# Patient Record
Sex: MALE | Race: WHITE | NOT HISPANIC OR LATINO | Employment: UNEMPLOYED | URBAN - METROPOLITAN AREA
[De-identification: names, ages, dates, MRNs, and addresses within clinical notes are randomized per-mention and may not be internally consistent; named-entity substitution may affect disease eponyms.]

---

## 2020-06-15 PROBLEM — I10 ESSENTIAL (PRIMARY) HYPERTENSION: Status: ACTIVE | Noted: 2017-09-27

## 2020-06-15 PROBLEM — M75.101 ROTATOR CUFF SYNDROME OF RIGHT SHOULDER: Status: ACTIVE | Noted: 2018-06-25

## 2020-06-15 PROBLEM — E11.9 TYPE 2 DIABETES MELLITUS WITHOUT COMPLICATIONS: Status: ACTIVE | Noted: 2017-09-27

## 2020-06-15 PROBLEM — E78.5 HYPERLIPIDEMIA: Status: ACTIVE | Noted: 2017-09-27

## 2020-06-15 PROBLEM — R25.2 LEG CRAMPS: Status: ACTIVE | Noted: 2017-10-05

## 2020-06-15 PROBLEM — E11.65 TYPE 2 DIABETES MELLITUS WITH HYPERGLYCEMIA: Status: ACTIVE | Noted: 2018-07-23

## 2020-06-15 PROBLEM — G47.00 INSOMNIA: Status: ACTIVE | Noted: 2017-09-27

## 2020-06-15 PROBLEM — K59.9 BOWEL DYSFUNCTION: Status: ACTIVE | Noted: 2018-10-22

## 2020-06-15 PROBLEM — R55 SYNCOPE: Status: ACTIVE | Noted: 2017-09-27

## 2020-08-03 ENCOUNTER — HOSPITAL ENCOUNTER (INPATIENT)
Facility: HOSPITAL | Age: 50
LOS: 5 days | Discharge: HOME | DRG: 897 | End: 2020-08-08
Attending: EMERGENCY MEDICINE | Admitting: INTERNAL MEDICINE
Payer: COMMERCIAL

## 2020-08-03 DIAGNOSIS — I10 ESSENTIAL (PRIMARY) HYPERTENSION: ICD-10-CM

## 2020-08-03 DIAGNOSIS — R07.9 CHEST PAIN: ICD-10-CM

## 2020-08-03 DIAGNOSIS — E83.42 HYPOMAGNESEMIA: ICD-10-CM

## 2020-08-03 DIAGNOSIS — R74.01 TRANSAMINITIS: ICD-10-CM

## 2020-08-03 DIAGNOSIS — R55 SYNCOPE: ICD-10-CM

## 2020-08-03 DIAGNOSIS — E87.6 HYPOKALEMIA: ICD-10-CM

## 2020-08-03 DIAGNOSIS — E83.39 HYPOPHOSPHATEMIA: ICD-10-CM

## 2020-08-03 DIAGNOSIS — E11.65 TYPE 2 DIABETES MELLITUS WITH HYPERGLYCEMIA, WITHOUT LONG-TERM CURRENT USE OF INSULIN: ICD-10-CM

## 2020-08-03 DIAGNOSIS — F10.939 ALCOHOL WITHDRAWAL SYNDROME WITH COMPLICATION: Primary | ICD-10-CM

## 2020-08-03 DIAGNOSIS — E87.29 HIGH ANION GAP METABOLIC ACIDOSIS: ICD-10-CM

## 2020-08-03 DIAGNOSIS — E78.5 HYPERLIPIDEMIA, UNSPECIFIED HYPERLIPIDEMIA TYPE: ICD-10-CM

## 2020-08-03 DIAGNOSIS — F10.931 ALCOHOL WITHDRAWAL DELIRIUM, ACUTE, HYPERACTIVE: ICD-10-CM

## 2020-08-03 PROBLEM — D53.9 MACROCYTIC ANEMIA: Status: ACTIVE | Noted: 2020-08-03

## 2020-08-03 PROBLEM — R63.4 UNINTENTIONAL WEIGHT LOSS: Status: ACTIVE | Noted: 2020-08-03

## 2020-08-03 LAB
ALBUMIN SERPL BCP-MCNC: 3 G/DL (ref 3.5–5.2)
ALLENS TEST: ABNORMAL
ALP SERPL-CCNC: 164 U/L (ref 55–135)
ALT SERPL W/O P-5'-P-CCNC: 63 U/L (ref 10–44)
AMPHET+METHAMPHET UR QL: NEGATIVE
ANION GAP SERPL CALC-SCNC: 18 MMOL/L (ref 8–16)
AST SERPL-CCNC: 236 U/L (ref 10–40)
B-OH-BUTYR BLD STRIP-SCNC: 0.5 MMOL/L (ref 0–0.5)
BARBITURATES UR QL SCN>200 NG/ML: NEGATIVE
BASOPHILS # BLD AUTO: 0.04 K/UL (ref 0–0.2)
BASOPHILS NFR BLD: 0.8 % (ref 0–1.9)
BENZODIAZ UR QL SCN>200 NG/ML: NEGATIVE
BILIRUB SERPL-MCNC: 0.7 MG/DL (ref 0.1–1)
BUN SERPL-MCNC: 6 MG/DL (ref 6–20)
BZE UR QL SCN: NEGATIVE
CALCIUM SERPL-MCNC: 7.8 MG/DL (ref 8.7–10.5)
CANNABINOIDS UR QL SCN: NEGATIVE
CHLORIDE SERPL-SCNC: 104 MMOL/L (ref 95–110)
CK SERPL-CCNC: 80 U/L (ref 20–200)
CO2 SERPL-SCNC: 17 MMOL/L (ref 23–29)
CREAT SERPL-MCNC: 1.3 MG/DL (ref 0.5–1.4)
CREAT UR-MCNC: 25.3 MG/DL (ref 23–375)
DIFFERENTIAL METHOD: ABNORMAL
EOSINOPHIL # BLD AUTO: 0 K/UL (ref 0–0.5)
EOSINOPHIL NFR BLD: 0.6 % (ref 0–8)
ERYTHROCYTE [DISTWIDTH] IN BLOOD BY AUTOMATED COUNT: 12.7 % (ref 11.5–14.5)
EST. GFR  (AFRICAN AMERICAN): >60 ML/MIN/1.73 M^2
EST. GFR  (NON AFRICAN AMERICAN): >60 ML/MIN/1.73 M^2
ESTIMATED AVG GLUCOSE: 105 MG/DL (ref 68–131)
ETHANOL SERPL-MCNC: 137 MG/DL
FERRITIN SERPL-MCNC: 584 NG/ML (ref 20–300)
GLUCOSE SERPL-MCNC: 271 MG/DL (ref 70–110)
HBA1C MFR BLD HPLC: 5.3 % (ref 4–5.6)
HCO3 UR-SCNC: 16.6 MMOL/L (ref 24–28)
HCT VFR BLD AUTO: 33.1 % (ref 40–54)
HGB BLD-MCNC: 11.3 G/DL (ref 14–18)
IMM GRANULOCYTES # BLD AUTO: 0.01 K/UL (ref 0–0.04)
IMM GRANULOCYTES NFR BLD AUTO: 0.2 % (ref 0–0.5)
INR PPP: 1.2 (ref 0.8–1.2)
LYMPHOCYTES # BLD AUTO: 0.6 K/UL (ref 1–4.8)
LYMPHOCYTES NFR BLD: 11.6 % (ref 18–48)
MAGNESIUM SERPL-MCNC: 1.2 MG/DL (ref 1.6–2.6)
MCH RBC QN AUTO: 36 PG (ref 27–31)
MCHC RBC AUTO-ENTMCNC: 34.1 G/DL (ref 32–36)
MCV RBC AUTO: 105 FL (ref 82–98)
METHADONE UR QL SCN>300 NG/ML: NEGATIVE
MONOCYTES # BLD AUTO: 0.4 K/UL (ref 0.3–1)
MONOCYTES NFR BLD: 8.2 % (ref 4–15)
NEUTROPHILS # BLD AUTO: 3.9 K/UL (ref 1.8–7.7)
NEUTROPHILS NFR BLD: 78.6 % (ref 38–73)
NRBC BLD-RTO: 0 /100 WBC
OPIATES UR QL SCN: NEGATIVE
PCO2 BLDA: 29.1 MMHG (ref 35–45)
PCP UR QL SCN>25 NG/ML: NEGATIVE
PH SMN: 7.37 [PH] (ref 7.35–7.45)
PLATELET # BLD AUTO: 111 K/UL (ref 150–350)
PMV BLD AUTO: 10.9 FL (ref 9.2–12.9)
PO2 BLDA: 67 MMHG (ref 40–60)
POC BE: -9 MMOL/L
POC SATURATED O2: 93 % (ref 95–100)
POC TCO2: 18 MMOL/L (ref 24–29)
POCT GLUCOSE: 249 MG/DL (ref 70–110)
POTASSIUM SERPL-SCNC: 3.4 MMOL/L (ref 3.5–5.1)
PROT SERPL-MCNC: 6.6 G/DL (ref 6–8.4)
PROTHROMBIN TIME: 12.1 SEC (ref 9–12.5)
RBC # BLD AUTO: 3.14 M/UL (ref 4.6–6.2)
SAMPLE: ABNORMAL
SARS-COV-2 RDRP RESP QL NAA+PROBE: NEGATIVE
SITE: ABNORMAL
SODIUM SERPL-SCNC: 139 MMOL/L (ref 136–145)
TOXICOLOGY INFORMATION: NORMAL
TROPONIN I SERPL DL<=0.01 NG/ML-MCNC: 0.01 NG/ML (ref 0–0.03)
WBC # BLD AUTO: 5 K/UL (ref 3.9–12.7)

## 2020-08-03 PROCEDURE — 80320 DRUG SCREEN QUANTALCOHOLS: CPT

## 2020-08-03 PROCEDURE — 82550 ASSAY OF CK (CPK): CPT

## 2020-08-03 PROCEDURE — 96361 HYDRATE IV INFUSION ADD-ON: CPT

## 2020-08-03 PROCEDURE — 25000003 PHARM REV CODE 250: Performed by: EMERGENCY MEDICINE

## 2020-08-03 PROCEDURE — 82728 ASSAY OF FERRITIN: CPT

## 2020-08-03 PROCEDURE — 25000003 PHARM REV CODE 250: Performed by: STUDENT IN AN ORGANIZED HEALTH CARE EDUCATION/TRAINING PROGRAM

## 2020-08-03 PROCEDURE — U0002 COVID-19 LAB TEST NON-CDC: HCPCS

## 2020-08-03 PROCEDURE — 96376 TX/PRO/DX INJ SAME DRUG ADON: CPT

## 2020-08-03 PROCEDURE — 63600175 PHARM REV CODE 636 W HCPCS: Performed by: STUDENT IN AN ORGANIZED HEALTH CARE EDUCATION/TRAINING PROGRAM

## 2020-08-03 PROCEDURE — 93005 ELECTROCARDIOGRAM TRACING: CPT

## 2020-08-03 PROCEDURE — 80307 DRUG TEST PRSMV CHEM ANLYZR: CPT

## 2020-08-03 PROCEDURE — 96375 TX/PRO/DX INJ NEW DRUG ADDON: CPT

## 2020-08-03 PROCEDURE — 80053 COMPREHEN METABOLIC PANEL: CPT

## 2020-08-03 PROCEDURE — 85610 PROTHROMBIN TIME: CPT

## 2020-08-03 PROCEDURE — 63600175 PHARM REV CODE 636 W HCPCS: Performed by: EMERGENCY MEDICINE

## 2020-08-03 PROCEDURE — 99900035 HC TECH TIME PER 15 MIN (STAT)

## 2020-08-03 PROCEDURE — 82962 GLUCOSE BLOOD TEST: CPT

## 2020-08-03 PROCEDURE — 83735 ASSAY OF MAGNESIUM: CPT

## 2020-08-03 PROCEDURE — 82010 KETONE BODYS QUAN: CPT

## 2020-08-03 PROCEDURE — 12000002 HC ACUTE/MED SURGE SEMI-PRIVATE ROOM

## 2020-08-03 PROCEDURE — 82803 BLOOD GASES ANY COMBINATION: CPT

## 2020-08-03 PROCEDURE — 82607 VITAMIN B-12: CPT

## 2020-08-03 PROCEDURE — 99291 CRITICAL CARE FIRST HOUR: CPT | Mod: 25

## 2020-08-03 PROCEDURE — 84484 ASSAY OF TROPONIN QUANT: CPT

## 2020-08-03 PROCEDURE — 82746 ASSAY OF FOLIC ACID SERUM: CPT

## 2020-08-03 PROCEDURE — 85025 COMPLETE CBC W/AUTO DIFF WBC: CPT

## 2020-08-03 PROCEDURE — 83036 HEMOGLOBIN GLYCOSYLATED A1C: CPT

## 2020-08-03 PROCEDURE — 96365 THER/PROPH/DIAG IV INF INIT: CPT

## 2020-08-03 PROCEDURE — 83540 ASSAY OF IRON: CPT

## 2020-08-03 RX ORDER — LORAZEPAM 1 MG/1
2 TABLET ORAL EVERY 4 HOURS PRN
Status: DISCONTINUED | OUTPATIENT
Start: 2020-08-03 | End: 2020-08-03

## 2020-08-03 RX ORDER — SODIUM CHLORIDE 9 MG/ML
1000 INJECTION, SOLUTION INTRAVENOUS
Status: COMPLETED | OUTPATIENT
Start: 2020-08-03 | End: 2020-08-03

## 2020-08-03 RX ORDER — POTASSIUM CHLORIDE 7.45 MG/ML
10 INJECTION INTRAVENOUS
Status: DISPENSED | OUTPATIENT
Start: 2020-08-03 | End: 2020-08-04

## 2020-08-03 RX ORDER — CALCIUM CARBONATE 200(500)MG
1000 TABLET,CHEWABLE ORAL
Status: COMPLETED | OUTPATIENT
Start: 2020-08-03 | End: 2020-08-03

## 2020-08-03 RX ORDER — SODIUM CHLORIDE 0.9 % (FLUSH) 0.9 %
10 SYRINGE (ML) INJECTION
Status: DISCONTINUED | OUTPATIENT
Start: 2020-08-03 | End: 2020-08-08 | Stop reason: HOSPADM

## 2020-08-03 RX ORDER — LORAZEPAM 2 MG/ML
2 CONCENTRATE ORAL ONCE
Status: DISCONTINUED | OUTPATIENT
Start: 2020-08-03 | End: 2020-08-03

## 2020-08-03 RX ORDER — FOLIC ACID 1 MG/1
1 TABLET ORAL DAILY
Status: DISCONTINUED | OUTPATIENT
Start: 2020-08-04 | End: 2020-08-08 | Stop reason: HOSPADM

## 2020-08-03 RX ORDER — INSULIN ASPART 100 [IU]/ML
1-10 INJECTION, SOLUTION INTRAVENOUS; SUBCUTANEOUS
Status: DISCONTINUED | OUTPATIENT
Start: 2020-08-03 | End: 2020-08-05

## 2020-08-03 RX ORDER — ASPIRIN 325 MG
325 TABLET ORAL
Status: DISCONTINUED | OUTPATIENT
Start: 2020-08-03 | End: 2020-08-03

## 2020-08-03 RX ORDER — LANOLIN ALCOHOL/MO/W.PET/CERES
400 CREAM (GRAM) TOPICAL ONCE
Status: COMPLETED | OUTPATIENT
Start: 2020-08-03 | End: 2020-08-03

## 2020-08-03 RX ORDER — IBUPROFEN 200 MG
24 TABLET ORAL
Status: DISCONTINUED | OUTPATIENT
Start: 2020-08-03 | End: 2020-08-05

## 2020-08-03 RX ORDER — MAGNESIUM SULFATE HEPTAHYDRATE 40 MG/ML
2 INJECTION, SOLUTION INTRAVENOUS ONCE
Status: COMPLETED | OUTPATIENT
Start: 2020-08-03 | End: 2020-08-03

## 2020-08-03 RX ORDER — GLUCAGON 1 MG
1 KIT INJECTION
Status: DISCONTINUED | OUTPATIENT
Start: 2020-08-03 | End: 2020-08-05

## 2020-08-03 RX ORDER — DIAZEPAM 5 MG/1
10 TABLET ORAL EVERY 6 HOURS
Status: DISCONTINUED | OUTPATIENT
Start: 2020-08-03 | End: 2020-08-06

## 2020-08-03 RX ORDER — THIAMINE HCL 100 MG
100 TABLET ORAL DAILY
Status: DISCONTINUED | OUTPATIENT
Start: 2020-08-04 | End: 2020-08-03

## 2020-08-03 RX ORDER — IBUPROFEN 200 MG
16 TABLET ORAL
Status: DISCONTINUED | OUTPATIENT
Start: 2020-08-03 | End: 2020-08-05

## 2020-08-03 RX ORDER — THIAMINE HCL 100 MG
100 TABLET ORAL DAILY
Status: DISCONTINUED | OUTPATIENT
Start: 2020-08-04 | End: 2020-08-08 | Stop reason: HOSPADM

## 2020-08-03 RX ADMIN — LORAZEPAM 1 MG: 2 INJECTION INTRAMUSCULAR; INTRAVENOUS at 07:08

## 2020-08-03 RX ADMIN — POTASSIUM CHLORIDE 10 MEQ: 7.46 INJECTION, SOLUTION INTRAVENOUS at 09:08

## 2020-08-03 RX ADMIN — SODIUM CHLORIDE 1000 ML: 0.9 INJECTION, SOLUTION INTRAVENOUS at 06:08

## 2020-08-03 RX ADMIN — SODIUM CHLORIDE 1000 ML: 0.9 INJECTION, SOLUTION INTRAVENOUS at 07:08

## 2020-08-03 RX ADMIN — Medication 400 MG: at 07:08

## 2020-08-03 RX ADMIN — MAGNESIUM SULFATE IN WATER 2 G: 40 INJECTION, SOLUTION INTRAVENOUS at 09:08

## 2020-08-03 RX ADMIN — BACITRACIN, NEOMYCIN, POLYMYXIN B 1 EACH: 400; 3.5; 5 OINTMENT TOPICAL at 06:08

## 2020-08-03 RX ADMIN — LORAZEPAM 5 MG: 2 INJECTION INTRAMUSCULAR; INTRAVENOUS at 11:08

## 2020-08-03 RX ADMIN — LORAZEPAM 2 MG: 2 INJECTION INTRAMUSCULAR; INTRAVENOUS at 09:08

## 2020-08-03 RX ADMIN — LORAZEPAM 1 MG: 2 INJECTION INTRAMUSCULAR; INTRAVENOUS at 06:08

## 2020-08-03 RX ADMIN — DIAZEPAM 10 MG: 5 TABLET ORAL at 09:08

## 2020-08-03 RX ADMIN — CALCIUM CARBONATE (ANTACID) CHEW TAB 500 MG 1000 MG: 500 CHEW TAB at 07:08

## 2020-08-03 NOTE — ED PROVIDER NOTES
Encounter Date: 8/3/2020    SCRIBE #1 NOTE: I, Kwesi Singh , am scribing for, and in the presence of, Dr. Guy Lefort .       History     Chief Complaint   Patient presents with    Fall     Patient states that he tripped and fell just PTA - EMS reports patient found on ground, awake with BP 60/P. On arrival, awake, alert, oriented. Abrasions noted to right brow, right elbow, and left knee. Patient insists that he tripped. EMS reports that they believe he passed out.      Patient is a 50 y.o male who presents to the ED after a fall. He face planted outside of a gas station. Pt had not eaten anything all day. EMS report that he passed out. Pt was drinking heavily over the pass couple of days. Pt has bruised knuckles and knee from fall. He reports not being in any pain except for back of neck. ROM normal.         Review of patient's allergies indicates:  No Known Allergies  Past Medical History:   Diagnosis Date    Diabetes mellitus      Past Surgical History:   Procedure Laterality Date    BACK SURGERY      KNEE SURGERY       History reviewed. No pertinent family history.  Social History     Tobacco Use    Smoking status: Current Some Day Smoker   Substance Use Topics    Alcohol use: Yes     Comment: social    Drug use: No     Review of Systems   Unable to perform ROS: Acuity of condition       Physical Exam     Initial Vitals [08/03/20 1716]   BP Pulse Resp Temp SpO2   98/64 82 17 -- 98 %      MAP       --         Physical Exam    Nursing note and vitals reviewed.  Constitutional: He appears well-developed and well-nourished. He is diaphoretic. No distress.   HENT:   Head: Normocephalic.   Abrasion R eyebrow  DMM   Eyes: Conjunctivae and EOM are normal. Pupils are equal, round, and reactive to light.   Neck: Neck supple.   No MLT   Cardiovascular: Normal rate, regular rhythm and normal heart sounds. Exam reveals no gallop and no friction rub.    No murmur heard.  Pulmonary/Chest: Breath sounds normal. He has no  wheezes. He has no rhonchi. He has no rales.   Abdominal: Soft. He exhibits no mass. There is no abdominal tenderness. There is no rebound and no guarding.   Musculoskeletal: Normal range of motion. No tenderness or edema.   Neurological: He is alert and oriented to person, place, and time. He has normal strength.   tremulous   Skin: Skin is warm. No rash and no abscess noted.   bruised knuckles and knee from fall  Scattered abrasions, various aged appearance   Psychiatric:   anxious         ED Course   Critical Care    Date/Time: 8/3/2020 7:19 PM  Performed by: Guy J. Lefort, MD  Authorized by: Guy J. Lefort, MD   Total critical care time (exclusive of procedural time) : 35 minutes  Critical care time was exclusive of separately billable procedures and treating other patients.  Critical care was necessary to treat or prevent imminent or life-threatening deterioration of the following conditions: metabolic crisis and dehydration.  Critical care was time spent personally by me on the following activities: ordering and review of radiographic studies, ordering and review of laboratory studies, ordering and performing treatments and interventions, obtaining history from patient or surrogate, blood draw for specimens, development of treatment plan with patient or surrogate, evaluation of patient's response to treatment, examination of patient, review of old charts and re-evaluation of patient's condition.        Labs Reviewed   COMPREHENSIVE METABOLIC PANEL   CBC W/ AUTO DIFFERENTIAL   PROTIME-INR   TROPONIN I   MAGNESIUM   CK   POCT GLUCOSE MONITORING CONTINUOUS          Imaging Results    None          Medical Decision Making:   Differential Diagnosis:   Differential Diagnosis includes, but is not limited to:  Arrhythmia, aortic dissection, MI/unstable angina, PE, cardiogenic shock, CHF, CVA/TIA, intracranial lesion/mass, seizure, perforated viscous, ruptured AAA, orthostatic hypotension, vasovagal episode, anemia,  dehydration, medication reaction, intentional overdose    Independently Interpreted Test(s):   I have ordered and independently interpreted X-rays - see prior notes.  I have ordered and independently interpreted EKG Reading(s) - see prior notes  Clinical Tests:   Lab Tests: Ordered and Reviewed  Radiological Study: Ordered and Reviewed  Medical Tests: Ordered and Reviewed  ED Management:  C-collar cleared      CIWA 13  Requiring repeat ativan dosing for tremors  Remains symptomatic  AG noted    Other:   I have discussed this case with another health care provider.                   ED Course as of Aug 04 1622   Mon Aug 03, 2020   1929 Spoke with [facility] who will admit the patient.     [AF]      ED Course User Index  [AF] Jazlyn Paniagua            Clinical Impression:     1. Chest pain    2. Syncope                     I, Dr. Guy Lefort, personally performed the services described in this documentation. All medical record entries made by the scribe were at my direction and in my presence. I have reviewed the chart and agree that the record reflects my personal performance and is accurate and complete. Guy Lefort, MD.  7:14 PM 08/03/2020  scribe attestation               Guy J. Lefort, MD  08/04/20 3499

## 2020-08-04 ENCOUNTER — CLINICAL SUPPORT (OUTPATIENT)
Dept: SMOKING CESSATION | Facility: CLINIC | Age: 50
End: 2020-08-04

## 2020-08-04 DIAGNOSIS — F17.210 CIGARETTE SMOKER: Primary | ICD-10-CM

## 2020-08-04 LAB
ALBUMIN SERPL BCP-MCNC: 3 G/DL (ref 3.5–5.2)
ALP SERPL-CCNC: 152 U/L (ref 55–135)
ALT SERPL W/O P-5'-P-CCNC: 54 U/L (ref 10–44)
ANION GAP SERPL CALC-SCNC: 11 MMOL/L (ref 8–16)
AST SERPL-CCNC: 171 U/L (ref 10–40)
BASOPHILS # BLD AUTO: 0.02 K/UL (ref 0–0.2)
BASOPHILS NFR BLD: 0.5 % (ref 0–1.9)
BILIRUB SERPL-MCNC: 0.9 MG/DL (ref 0.1–1)
BUN SERPL-MCNC: 7 MG/DL (ref 6–20)
CALCIUM SERPL-MCNC: 8.1 MG/DL (ref 8.7–10.5)
CHLORIDE SERPL-SCNC: 107 MMOL/L (ref 95–110)
CHOLEST SERPL-MCNC: 137 MG/DL (ref 120–199)
CHOLEST/HDLC SERPL: 2.1 {RATIO} (ref 2–5)
CO2 SERPL-SCNC: 23 MMOL/L (ref 23–29)
CREAT SERPL-MCNC: 1.3 MG/DL (ref 0.5–1.4)
DIFFERENTIAL METHOD: ABNORMAL
EOSINOPHIL # BLD AUTO: 0 K/UL (ref 0–0.5)
EOSINOPHIL NFR BLD: 0.2 % (ref 0–8)
ERYTHROCYTE [DISTWIDTH] IN BLOOD BY AUTOMATED COUNT: 12.6 % (ref 11.5–14.5)
EST. GFR  (AFRICAN AMERICAN): >60 ML/MIN/1.73 M^2
EST. GFR  (NON AFRICAN AMERICAN): >60 ML/MIN/1.73 M^2
FOLATE SERPL-MCNC: 7.9 NG/ML (ref 4–24)
GLUCOSE SERPL-MCNC: 170 MG/DL (ref 70–110)
HCT VFR BLD AUTO: 31.9 % (ref 40–54)
HDLC SERPL-MCNC: 64 MG/DL (ref 40–75)
HDLC SERPL: 46.7 % (ref 20–50)
HGB BLD-MCNC: 10.8 G/DL (ref 14–18)
IMM GRANULOCYTES # BLD AUTO: 0.01 K/UL (ref 0–0.04)
IMM GRANULOCYTES NFR BLD AUTO: 0.2 % (ref 0–0.5)
IRON SERPL-MCNC: 63 UG/DL (ref 45–160)
LDLC SERPL CALC-MCNC: 58.8 MG/DL (ref 63–159)
LYMPHOCYTES # BLD AUTO: 0.4 K/UL (ref 1–4.8)
LYMPHOCYTES NFR BLD: 10.2 % (ref 18–48)
MCH RBC QN AUTO: 35.8 PG (ref 27–31)
MCHC RBC AUTO-ENTMCNC: 33.9 G/DL (ref 32–36)
MCV RBC AUTO: 106 FL (ref 82–98)
MONOCYTES # BLD AUTO: 0.5 K/UL (ref 0.3–1)
MONOCYTES NFR BLD: 11.6 % (ref 4–15)
NEUTROPHILS # BLD AUTO: 3.3 K/UL (ref 1.8–7.7)
NEUTROPHILS NFR BLD: 77.3 % (ref 38–73)
NONHDLC SERPL-MCNC: 73 MG/DL
NRBC BLD-RTO: 0 /100 WBC
PLATELET # BLD AUTO: 91 K/UL (ref 150–350)
PMV BLD AUTO: 11 FL (ref 9.2–12.9)
POCT GLUCOSE: 146 MG/DL (ref 70–110)
POCT GLUCOSE: 160 MG/DL (ref 70–110)
POCT GLUCOSE: 183 MG/DL (ref 70–110)
POCT GLUCOSE: 185 MG/DL (ref 70–110)
POCT GLUCOSE: 191 MG/DL (ref 70–110)
POTASSIUM SERPL-SCNC: 3.4 MMOL/L (ref 3.5–5.1)
PROT SERPL-MCNC: 6.9 G/DL (ref 6–8.4)
RBC # BLD AUTO: 3.02 M/UL (ref 4.6–6.2)
SATURATED IRON: 26 % (ref 20–50)
SODIUM SERPL-SCNC: 141 MMOL/L (ref 136–145)
TOTAL IRON BINDING CAPACITY: 244 UG/DL (ref 250–450)
TRANSFERRIN SERPL-MCNC: 165 MG/DL (ref 200–375)
TRANSFERRIN SERPL-MCNC: 165 MG/DL (ref 200–375)
TRIGL SERPL-MCNC: 71 MG/DL (ref 30–150)
VIT B12 SERPL-MCNC: 1006 PG/ML (ref 210–950)
WBC # BLD AUTO: 4.31 K/UL (ref 3.9–12.7)

## 2020-08-04 PROCEDURE — 63600175 PHARM REV CODE 636 W HCPCS: Performed by: STUDENT IN AN ORGANIZED HEALTH CARE EDUCATION/TRAINING PROGRAM

## 2020-08-04 PROCEDURE — 25000003 PHARM REV CODE 250: Performed by: STUDENT IN AN ORGANIZED HEALTH CARE EDUCATION/TRAINING PROGRAM

## 2020-08-04 PROCEDURE — 80061 LIPID PANEL: CPT

## 2020-08-04 PROCEDURE — 99406 PT REFUSED TOBACCO CESSATION: ICD-10-PCS | Mod: S$GLB,,,

## 2020-08-04 PROCEDURE — 27000221 HC OXYGEN, UP TO 24 HOURS

## 2020-08-04 PROCEDURE — 63600175 PHARM REV CODE 636 W HCPCS: Performed by: INTERNAL MEDICINE

## 2020-08-04 PROCEDURE — 94761 N-INVAS EAR/PLS OXIMETRY MLT: CPT

## 2020-08-04 PROCEDURE — 99900035 HC TECH TIME PER 15 MIN (STAT)

## 2020-08-04 PROCEDURE — 80053 COMPREHEN METABOLIC PANEL: CPT

## 2020-08-04 PROCEDURE — 85025 COMPLETE CBC W/AUTO DIFF WBC: CPT

## 2020-08-04 PROCEDURE — 11000001 HC ACUTE MED/SURG PRIVATE ROOM

## 2020-08-04 PROCEDURE — 99406 BEHAV CHNG SMOKING 3-10 MIN: CPT | Mod: S$GLB,,,

## 2020-08-04 RX ORDER — LORAZEPAM 2 MG/ML
3 INJECTION INTRAMUSCULAR EVERY 4 HOURS
Status: CANCELLED | OUTPATIENT
Start: 2020-08-04

## 2020-08-04 RX ORDER — ENOXAPARIN SODIUM 100 MG/ML
40 INJECTION SUBCUTANEOUS EVERY 24 HOURS
Status: DISCONTINUED | OUTPATIENT
Start: 2020-08-04 | End: 2020-08-08 | Stop reason: HOSPADM

## 2020-08-04 RX ORDER — DEXMEDETOMIDINE HYDROCHLORIDE 4 UG/ML
0.5 INJECTION, SOLUTION INTRAVENOUS CONTINUOUS
Status: DISCONTINUED | OUTPATIENT
Start: 2020-08-04 | End: 2020-08-04

## 2020-08-04 RX ORDER — GABAPENTIN 300 MG/1
300 CAPSULE ORAL 3 TIMES DAILY
Status: DISCONTINUED | OUTPATIENT
Start: 2020-08-04 | End: 2020-08-04

## 2020-08-04 RX ORDER — GABAPENTIN 100 MG/1
200 CAPSULE ORAL 2 TIMES DAILY
Status: DISCONTINUED | OUTPATIENT
Start: 2020-08-04 | End: 2020-08-05

## 2020-08-04 RX ORDER — POTASSIUM CHLORIDE 20 MEQ/1
40 TABLET, EXTENDED RELEASE ORAL ONCE
Status: COMPLETED | OUTPATIENT
Start: 2020-08-04 | End: 2020-08-04

## 2020-08-04 RX ORDER — ENOXAPARIN SODIUM 100 MG/ML
30 INJECTION SUBCUTANEOUS EVERY 24 HOURS
Status: DISCONTINUED | OUTPATIENT
Start: 2020-08-04 | End: 2020-08-04

## 2020-08-04 RX ORDER — DEXMEDETOMIDINE HYDROCHLORIDE 4 UG/ML
INJECTION, SOLUTION INTRAVENOUS
Status: DISPENSED
Start: 2020-08-04 | End: 2020-08-04

## 2020-08-04 RX ADMIN — POTASSIUM CHLORIDE 40 MEQ: 1500 TABLET, EXTENDED RELEASE ORAL at 08:08

## 2020-08-04 RX ADMIN — GABAPENTIN 300 MG: 300 CAPSULE ORAL at 03:08

## 2020-08-04 RX ADMIN — ENOXAPARIN SODIUM 40 MG: 100 INJECTION SUBCUTANEOUS at 06:08

## 2020-08-04 RX ADMIN — DIAZEPAM 10 MG: 5 TABLET ORAL at 06:08

## 2020-08-04 RX ADMIN — LORAZEPAM 3 MG: 2 INJECTION INTRAMUSCULAR; INTRAVENOUS at 10:08

## 2020-08-04 RX ADMIN — DIAZEPAM 10 MG: 5 TABLET ORAL at 05:08

## 2020-08-04 RX ADMIN — GABAPENTIN 200 MG: 100 CAPSULE ORAL at 09:08

## 2020-08-04 RX ADMIN — GABAPENTIN 200 MG: 100 CAPSULE ORAL at 08:08

## 2020-08-04 RX ADMIN — LORAZEPAM 3 MG: 2 INJECTION INTRAMUSCULAR; INTRAVENOUS at 05:08

## 2020-08-04 RX ADMIN — LORAZEPAM 3 MG: 2 INJECTION INTRAMUSCULAR; INTRAVENOUS at 01:08

## 2020-08-04 RX ADMIN — INSULIN ASPART 2 UNITS: 100 INJECTION, SOLUTION INTRAVENOUS; SUBCUTANEOUS at 11:08

## 2020-08-04 RX ADMIN — INSULIN ASPART 2 UNITS: 100 INJECTION, SOLUTION INTRAVENOUS; SUBCUTANEOUS at 07:08

## 2020-08-04 RX ADMIN — DIAZEPAM 10 MG: 5 TABLET ORAL at 11:08

## 2020-08-04 RX ADMIN — DEXMEDETOMIDINE HYDROCHLORIDE 0.5 MCG/KG/HR: 4 INJECTION, SOLUTION INTRAVENOUS at 03:08

## 2020-08-04 RX ADMIN — LORAZEPAM 3 MG: 2 INJECTION INTRAMUSCULAR; INTRAVENOUS at 09:08

## 2020-08-04 RX ADMIN — DIAZEPAM 10 MG: 5 TABLET ORAL at 12:08

## 2020-08-04 RX ADMIN — THERA TABS 1 TABLET: TAB at 08:08

## 2020-08-04 RX ADMIN — FOLIC ACID 1 MG: 1 TABLET ORAL at 08:08

## 2020-08-04 RX ADMIN — Medication 100 MG: at 08:08

## 2020-08-04 NOTE — PROGRESS NOTES
"[facility] Resident HO-4 Progress Note    Subjective:      Abe Cook is a 50 y.o.  male who is being followed by the [facility] service at [facility] for EtOH withdrawal. Overnight, moved to the ICU for increasing doses of ativan and requiring a precedex drip. This morning he is sleeping comfortably, no longer tachycardic. Still jittery when aroused. Denies specific complaints of HA, CP, SOB.     Objective:   Last 24 Hour Vital Signs:  BP  Min: 98/64  Max: 166/95  Temp  Av.6 °F (37 °C)  Min: 97.4 °F (36.3 °C)  Max: 99.7 °F (37.6 °C)  Pulse  Av.5  Min: 78  Max: 156  Resp  Av.5  Min: 15  Max: 36  SpO2  Av.7 %  Min: 93 %  Max: 98 %  Height  Av' 8.5" (174 cm)  Min: 5' 8" (172.7 cm)  Max: 5' 9" (175.3 cm)  Weight  Av.6 kg (186 lb 7.2 oz)  Min: 81.6 kg (180 lb)  Max: 87.5 kg (192 lb 14.4 oz)  I/O last 3 completed shifts:  In: 4448.3 [P.O.:50; I.V.:4298.3; IV Piggyback:100]  Out: 900 [Urine:900]    Physical Examination:  GEN: sleeping, easily awakens  HEENT: NC, superficial abrasion to right eye brow, no conjunctival injection, no rhinorhea  RESP: CTAB, no wheezes, on NC  CV: RRR, no murmurs, cap refill 3 seconds  ABD: BS present, soft, NT, ND   MSK: no deformities, no TTP  DERM: no rashes, multiple scabs over knuckles and palms  NEURO: A&O, no facial asymmetry, motor function grossly intact    Laboratory:  Laboratory Data Reviewed: yes  Pertinent Findings:  Recent Labs   Lab 20  1748 20  0326   WBC 5.00 4.31   HGB 11.3* 10.8*   HCT 33.1* 31.9*   * 91*   * 106*   RDW 12.7 12.6    141   K 3.4* 3.4*    107   CO2 17* 23   BUN 6 7   CREATININE 1.3 1.3   * 170*   PROT 6.6 6.9   ALBUMIN 3.0* 3.0*   BILITOT 0.7 0.9   * 171*   ALKPHOS 164* 152*   ALT 63* 54*       Microbiology Data Reviewed: yes  Pertinent Findings:  None    Other Results:  EKG (my interpretation): None new    Radiology Data Reviewed: yes  Pertinent " "Findings:  None new    Current Medications:     Infusions:   dexmedetomidine (PRECEDEX) infusion 0.6 mcg/kg/hr (08/04/20 0620)        Scheduled:   diazePAM  10 mg Oral Q6H    enoxaparin  30 mg Subcutaneous Q24H    folic acid  1 mg Oral Daily    gabapentin  200 mg Oral BID    multivitamin  1 tablet Oral Daily    potassium chloride  40 mEq Oral Once    thiamine  100 mg Oral Daily        PRN:  dextrose 50%, dextrose 50%, glucagon (human recombinant), glucose, glucose, insulin aspart U-100, lorazepam, sodium chloride 0.9%    Antibiotics and Day Number of Therapy:  None    Lines and Day Number of Therapy:  PIV 8/4-    Assessment:     Abe Cook is a 50 y.o. male with alcohol dependence, HTN, HLD, and T2DM who presents with EtOH wiithdrawal   Plan:     #Alcohol withdrawal  #Unintentional weight loss  #Fall  #Transaminitis  -Patient drinks daily for many months, recently increased during COVID quarantine, endorses approximately 20 lbs weight loss over this time  -Last drink was morning of admission  -Patient counseled on the health benefits of alcohol cessation, currently action stage of change, states "I don't want to drink when I leave the hospital"   -tripped and fell day of admission with negative head/neck imaging, superficial scrapes treated in ED  -still tachycardic and tremulous in the ED after 9 mg Ativan so moved to ICU  -Current regimen: Valium 10 mg q6hr, Precedex 0.5, Gabapentin 200 BID, Ativan 3 mg IV q1hr PRN (required once overnight)  -LFTs improving     #Macrocytic anemia  -likely related to malnutrition and EtOH abuse  -Around historyical Hgb baseline at 11.3, MCV higher than in the past at 105  -Iron studies consistent with chronic inflammation, folate and B12 normal  -Thiamine, folate and MVI     #Thrombocytopenia  -likely related to EtOH abuse  -trend daily     #AGMA  #Electrolyte derrangemnt  -Likely related to acute on chronic EtOH abuse and poor nutritional status   -Trend and replete " PRN     #T2DM not on long-term insulin  -A1C 5.3 this admission  -Holding home metformin  -SSI while inpatient     #HLD  -holding home statin     #HTN  -Holding home lisinoprol     Diet: Diabetic  PPX: Lovenox  FULL CODE     Dispo: wean Precedex this AM, hopeful step down later today    Liam Gómez  [facility] Internal Medicine HO-4    [facility] Medicine Hospitalist Pager numbers:   [facility] Hospitalist Medicine Team A (Andi/Khalida): [phone]  [facility] Hospitalist Medicine Team B (Jens/Barbie):  [phone]

## 2020-08-04 NOTE — ED NOTES
"  Report received from Lida FLOWERS and care assumed.   Adult Physical Assessment  LOC: Abe Cook, 50 y.o. male verified via two identifiers.  The patient is awake, alert, oriented and speaking appropriately at this time.  APPEARANCE: Patient restless and not comfortable upon assessment but appears to be in no acute distress at this time. Patient is clean and well groomed, patient's clothing is properly fastened.  SKIN:The skin is warm and dry, color consistent with ethnicity, patient has diminished  skin turgor and moist mucus membranes, multiple abrasions previously documented today.  MUSCULOSKELETAL: Patient moving all extremities well, no obvious swelling or deformities noted.  RESPIRATORY: Airway is open and patent, respirations are spontaneous, patient has a normal effort and rate, no accessory muscle use noted.  CARDIAC: Patient has a normal rate and rhythm, no periphreal edema noted in any extremity, capillary refill < 3 seconds in all extremities  ABDOMEN: Soft, ROUND and non tender to palpation, no abdominal distention noted. Bowel sounds present in all four quadrants.  NEUROLOGIC: Eyes open spontaneously, behavior appropriate to situation, follows commands, facial expression symmetrical, bilateral hand grasp equal and even, purposeful motor response noted, normal sensation in all extremities when touched with a finger.  Patient is very shaky, ambulated to the BR w some difficulty, states, "I hurt all over".  "

## 2020-08-04 NOTE — PLAN OF CARE
Date of Admit: 8/3/2020     Chief Complaint      Fall (Patient states that he tripped and fell just PTA - EMS reports patient found on ground, awake with BP 60/P. On arrival, awake, alert, oriented. Abrasions noted to right brow, right elbow, and left knee. Patient insists that he tripped. EMS reports that they believe he passed out. )   for 1 day     Subjective:      History of Present Illness:  Abe Cook is a 50 y.o.  male who  has a past medical history of Diabetes mellitus.. The patient presented to [facility] on 8/3/2020 with a primary complaint of Fall (Patient states that he tripped and fell just PTA - EMS reports patient found on ground, awake with BP 60/P. On arrival, awake, alert, oriented. Abrasions noted to right brow, right elbow, and left knee. Patient insists that he tripped. EMS reports that they believe he passed out. )     The pt's currently in ICU. He was asleep and wouldn't wake up so the  spoke to the pt's mother Fifi Cook [phone] to complete the assessment. The pt lives alone in [place] but his mother used to come to his home 3 days a week prior to COVID b/c he and his x wife share joint custody of their 12 y o daughter. The pt's mother was there cooking,cleaning,paying bills and whatever else needs to be done. The pt's independent with his adl's and doesn't use any dme. The pt's employed at a [employer] in [place]. The pt has a very very supportive mother who's also an enabler. The pt's mother acknowledges being an enabler stating she started 10 years ago assisting the pt with everything but it was supposed to be a short term fix but it's still going on due to his excessive drinking.     The pt went to an IP rehab 10 years ago for a month that wasn't covered by his insurance so his mother paid $12 thousand cash for him to enter the program. The pt came home and started drinking again. The pt's mother states she's 75 y o and she can't keep taking care of all  the pt's affairs b/c she's getting older and may drop at any minute. She states due to COVID the pt got laid off in the middle of March and just stayed home drinking all the time and wouldn't let her come into his home. She's in his home now b/c he's in the hospital and she has found bottles under the bed and in the back of the closets. She states he hides it well under a lot of mouthwash. Prior to COVID the pt was a functioning alcoholic,now he just sits and drinks all day and has lost weight. He's gotten progressively worse over the last 2 years.    She feels the pt should go straight to an IP rehab immediately from the hospital. The Sw spoke in depth with the pt's mother and educated her on the effects of being an enabler for the pt. Again she acknowledges it but states she's used to this b/c his father was an alcoholic and this addiction's heredity. In this family there are 4 generations of alcoholics. The Sw informed her it's a choice and she should allow the pt to own up to his responsibilities.     The Sw spoke to the pt after he woke up and he declines going into a rehab at d/c. He states he went to AA in the past that's 5 minutes from his house and he will be amendable to go there again. The Sw spoke to him about his mother's concerns and he acknowledged understanding but doesn't want to go into a rehab but will go to AA. The Sw offered to supply him with a list of rehabs but he wants to go home. The Sw left her name and contact info with the pt and his mother. The Sw gave the pt a d/c brochure. The Sw encouraged them to call if they have any further questions or concerns.     The Sw will continue to follow the pt throughout his transitions of care and will assist with any d./c needs.        08/04/20 1240   Discharge Assessment   Assessment Type Discharge Planning Assessment   Confirmed/corrected address and phone number on facesheet? Yes   Assessment information obtained from? Caregiver   Prior to  hospitilization cognitive status: Alert/Oriented   Prior to hospitalization functional status: Independent   Current cognitive status: Alert/Oriented   Current Functional Status: Needs Assistance   Lives With alone   Able to Return to Prior Arrangements yes   Is patient able to care for self after discharge? Unable to determine at this time (comments)   Who are your caregiver(s) and their phone number(s)? Fifi Cook(mother)[phone]   Patient's perception of discharge disposition home or selfcare   Readmission Within the Last 30 Days no previous admission in last 30 days   Patient currently being followed by outpatient case management? No   Patient currently receives any other outside agency services? No   Equipment Currently Used at Home none   Do you have any problems affording any of your prescribed medications? No  (the pt receives his meds affordably at [facility] in [place])   Is the patient taking medications as prescribed? yes   Does the patient have transportation home? Yes   Transportation Anticipated family or friend will provide   Does the patient receive services at the [facility]madin Clinic? No   Discharge Plan A Home with family   Discharge Plan B Rehab   DME Needed Upon Discharge  other (see comments)  (TBD)   Patient/Family in Agreement with Plan yes

## 2020-08-04 NOTE — PROGRESS NOTES
Smoking cessation education attempted, however, pt is arousable but very lethargic and unable to converse at present.  Will follow up at a later time.

## 2020-08-04 NOTE — PLAN OF CARE
Pt drowsy and confused to time. Afebrile overnight Tmax 99.7. ST, BP elevated with alcohol withdrawals without precedex gtt now controlled on gtt.  Sats remained >90 on 2L NC. UOP adequate with condom cath. Multiple abrasions present due to fall. No reports of pain with some visual hallucinations. Precedex gtt present. Frequent checks for safety done during shift. Will continue to monitor.

## 2020-08-04 NOTE — NURSING TRANSFER
Nursing Transfer Note      8/4/2020     Transfer To: room 420    Transfer via bed    Transfer with o2 cardiac monitoring    Transported by nurse and transporter    Medicines sent: Insulin pen    Chart send with patient: yes    Notified: Mother    Patient reassessed at: [id number]    Upon arrival to floor: cardiac monitor applied, patient oriented to room, call bell in reach and bed in lowest position

## 2020-08-04 NOTE — H&P
[facility] Internal Medicine H&P    Admitting Team: [facility] Team B  Attending Physician: Jens  Resident: Rico  Intern: Bao     Date of Admit: 8/3/2020    Chief Complaint     Fall (Patient states that he tripped and fell just PTA - EMS reports patient found on ground, awake with BP 60/P. On arrival, awake, alert, oriented. Abrasions noted to right brow, right elbow, and left knee. Patient insists that he tripped. EMS reports that they believe he passed out. )   for 1 day    Subjective:      History of Present Illness:  Abe Cook is a 50 y.o.  male who  has a past medical history of Diabetes mellitus.. The patient presented to [facility] on 8/3/2020 with a primary complaint of Fall (Patient states that he tripped and fell just PTA - EMS reports patient found on ground, awake with BP 60/P. On arrival, awake, alert, oriented. Abrasions noted to right brow, right elbow, and left knee. Patient insists that he tripped. EMS reports that they believe he passed out. )      The patient was in their usual state of health characterized by daily drinking, until earlier today when he tripped and fell over fueling pump at gas station. He has been trying to cut back on drinking recently, mixing a few shots in a water bottle to drink throughout the day as self medication for withdrawal symptoms. Denies HA, n/v/d, CP, SOB or MSK pain.    Past Medical History:  Past Medical History:   Diagnosis Date    Diabetes mellitus        Past Surgical History:  Past Surgical History:   Procedure Laterality Date    BACK SURGERY      KNEE SURGERY         Allergies:  Review of patient's allergies indicates:  No Known Allergies    Home Medications:  Prior to Admission medications    Medication Sig Start Date End Date Taking? Authorizing Provider   lisinopriL 10 MG tablet TAKE ONE TABLET BY MOUTH EVERY DAY 7/2/20   Nader Mace MD   metFORMIN (GLUCOPHAGE-XR) 750 MG XR 24hr tablet TAKE TWO TABLETS BY MOUTH ONCE DAILY 7/2/20   Nader  "MREYL Mace MD   rosuvastatin (CRESTOR) 10 MG tablet Take 10 mg by mouth Daily. 20   Historical Provider, MD       Family History:  History reviewed. No pertinent family history.    Social History:  Social History     Tobacco Use    Smoking status: Current Some Day Smoker   Substance Use Topics    Alcohol use: Yes     Comment: social    Drug use: No       Review of Systems:  Pertinent items are noted in HPI.     Health Maintaince :   Primary Care Physician: Nader Mace MD    Immunizations:   TDap UTD  Flu UTD    Cancer Screening:  Colonoscopy not UTD     Objective:   Last 24 Hour Vital Signs:  BP  Min: 98/64  Max: 102/72  Pulse  Av  Min: 86  Max: 102  Resp  Av  Min: 15  Max: 25  SpO2  Av %  Min: 96 %  Max: 98 %  Height  Av' 8" (172.7 cm)  Min: 5' 8" (172.7 cm)  Max: 5' 8" (172.7 cm)  Weight  Av.6 kg (180 lb)  Min: 81.6 kg (180 lb)  Max: 81.6 kg (180 lb)  Body mass index is 27.37 kg/m².  No intake/output data recorded.    Physical Examination:  GEN: awake, alert, jittery  HEENT: NC, superficial abrasion to right eye brow, PERRLA, no conjunctival injection, no rhinorhea, OP patent, no LAD  RESP: CTAB, no wheezes  CV: RRR, no murmurs, cap refill 3 seconds  ABD: BS present, soft, NT, ND   MSK: no deformities, no TTP  DERM: no rashes, multiple scabs over knuckles and palms  NEURO: A&O, no facial asymmetry, motor function grossly intact      Laboratory:  Most Recent Data:  CBC:   Recent Labs   Lab 20  1748   WBC 5.00   HGB 11.3*   HCT 33.1*   *   *   RDW 12.7      K 3.4*      CO2 17*   BUN 6   CREATININE 1.3   *   PROT 6.6   ALBUMIN 3.0*   BILITOT 0.7   *   ALKPHOS 164*   ALT 63*       Other Results:  EKG (my interpretation): sinus tach    Radiology:  CT head/neck: no acute process noted  CXR: good aeration, no acute process     Assessment:     Abe Cook is a 50 y.o. male with alcohol dependence, HTN, HLD, and T2DM who presents     Plan: "     #Alcohol withdrawal  #Unintentional weight loss  #Fall  #Transaminitis  -Patient drinks daily for many months, recently increased during COVID quarantine, endorses approximately 20 lbs weight loss over this time  -Last drink was this morning  -tripped and fell this morning with negative head/neck imaging, superficial scrapes treated in ED  -Ativan 5 mg x1 followed by Valium 10 mg q6hr and Ativan 2 mg IV q2hr PRN  -trending liver enzymes daily    #Macrocytic anemia  -likely related to malnutrition and EtOH abuse  -Currently around historyical Hgb baseline at 11.3, MCV higher than in the past at 105  -Iron studies, folic acid and B12 levels pending  -Thiamine, folate and MVI    #Thrombocytopenia  -likely related to EtOH abuse  -trend daily    #AGMA  #Electrolyte derrangemnt  -Likely related to acute on chronic EtOH abuse and poor nutritional status   -Trend and replete PRN    #T2DM not on long-term insulin  -Most recent A1C in our system 7.3 (2016)  -Repeat A1C pending  -Holding home metformin  -SSI while inpatient    #HLD  -holding home statin    #HTN  -Holding home lisinoprol    Diet: Diabetic  PPX: SCD  FULL CODE    Dispo: admit to inpatient for treatment of EtOH withdrawal      Liam Gómez MD  [facility] Internal Medicine HO4    [facility] Medicine Hospitalist Pager numbers:   [facility] Hospitalist Medicine Team A (Andi/Khalida): [phone]  [facility] Hospitalist Medicine Team B (Jens/Barbie):  [phone]

## 2020-08-04 NOTE — PLAN OF CARE
VN cued into pt's room for introduction with pt's permission.  VN role explained to pt, pt is lying in bed in low position with call bell within reach.  Pt is alert, without signs of anxiety, but reports visual hallucinations in room of seeing people, pt states feeling less anxious with Ativan given 1 hour.    Allowed time for questions.  No acute distress noted.  Will continue to be available as needed.

## 2020-08-04 NOTE — NURSING
Pt received from icu, alert, calm and cooperative, pt oriented to self and place. No c/o pain or withdrawal symptoms, will continue to monitor

## 2020-08-04 NOTE — EICU
Eicu brief admission review note.  Pt was examined on video, notes, images, labs  reviewed.  ETOH withdrawal was given multiple increasing doses of ativan then started on precedex, continue, thiamine, folic acid, MVI  S/p fall- CT head, cervical spine negative, CK negative  DM elevated BG, AGMA, negative b- hydroxybutyrate, SSI, holding metformin  Elevated LFTs c/w alcoholic hepatis-monitor  Mild anemia, thrombocytopenia 2/2 ETOH use, monitor  HypoK, HypoMg- supplement  AGMA- check LA, possibly 2/2 starvation, metformin  HTN- continue lisinopril  DVT propoh- Lovenox  D/w RN

## 2020-08-04 NOTE — PROGRESS NOTES
Pharmacist Renal Dose Adjustment Note    Abe Cook is a 50 y.o. male being treated with the medication enoxaparin    Patient Data:    Vital Signs (Most Recent):  Temp: 98.1 °F (36.7 °C) (08/04/20 1115)  Pulse: 76 (08/04/20 1100)  Resp: 18 (08/04/20 1100)  BP: 104/70 (08/04/20 1045)  SpO2: (!) 92 % (08/04/20 1100)   Vital Signs (72h Range):  Temp:  [97.4 °F (36.3 °C)-99.7 °F (37.6 °C)]   Pulse:  []   Resp:  [12-36]   BP: ()/()   SpO2:  [90 %-98 %]      Recent Labs   Lab 08/03/20  1748 08/04/20  0326   CREATININE 1.3 1.3     Serum creatinine: 1.3 mg/dL 08/04/20 0326  Estimated creatinine clearance: 73.6 mL/min    Medication:enoxaparin dose: 30mg frequency q24h will be changed to medication:enoxaparin dose:40mg frequency:q24h    Pharmacist's Name: Lyle Daniel  Pharmacist's Extension: [id number]

## 2020-08-04 NOTE — NURSING
"Pt called and reported visual hallucinations, pt crying and shaking, stated he saw " an old man, a woman, and a little girl", pt reassured that there was not anyone in the room, given prn ativan and scheduled valium. Pt seemed to calm down with reassurance and medication.   "

## 2020-08-04 NOTE — NURSING TRANSFER
Nursing Transfer Note      8/4/2020     Transfer From: ED    Transfer via bed    Transfer with cardiac monitoring    Transported by JOHANNE Ponce    Medicines sent: none    Chart send with patient: No    Notified: Fifi (mother)[phone]    Patient reassessed at: 8/4/2020 0205    Upon arrival to floor: cardiac monitor applied, patient oriented to room, call bell in reach and bed in lowest position

## 2020-08-05 ENCOUNTER — CLINICAL SUPPORT (OUTPATIENT)
Dept: SMOKING CESSATION | Facility: CLINIC | Age: 50
End: 2020-08-05

## 2020-08-05 DIAGNOSIS — F17.210 CIGARETTE SMOKER: Primary | ICD-10-CM

## 2020-08-05 LAB
ALBUMIN SERPL BCP-MCNC: 2.9 G/DL (ref 3.5–5.2)
ALP SERPL-CCNC: 148 U/L (ref 55–135)
ALT SERPL W/O P-5'-P-CCNC: 45 U/L (ref 10–44)
ANION GAP SERPL CALC-SCNC: 9 MMOL/L (ref 8–16)
AST SERPL-CCNC: 113 U/L (ref 10–40)
BASOPHILS # BLD AUTO: 0.02 K/UL (ref 0–0.2)
BASOPHILS NFR BLD: 0.5 % (ref 0–1.9)
BILIRUB SERPL-MCNC: 1.4 MG/DL (ref 0.1–1)
BUN SERPL-MCNC: 9 MG/DL (ref 6–20)
CALCIUM SERPL-MCNC: 8.5 MG/DL (ref 8.7–10.5)
CHLORIDE SERPL-SCNC: 108 MMOL/L (ref 95–110)
CO2 SERPL-SCNC: 24 MMOL/L (ref 23–29)
CREAT SERPL-MCNC: 1.5 MG/DL (ref 0.5–1.4)
DIFFERENTIAL METHOD: ABNORMAL
EOSINOPHIL # BLD AUTO: 0.1 K/UL (ref 0–0.5)
EOSINOPHIL NFR BLD: 1.2 % (ref 0–8)
ERYTHROCYTE [DISTWIDTH] IN BLOOD BY AUTOMATED COUNT: 12.2 % (ref 11.5–14.5)
EST. GFR  (AFRICAN AMERICAN): >60 ML/MIN/1.73 M^2
EST. GFR  (NON AFRICAN AMERICAN): 53 ML/MIN/1.73 M^2
GLUCOSE SERPL-MCNC: 130 MG/DL (ref 70–110)
HCT VFR BLD AUTO: 32.8 % (ref 40–54)
HGB BLD-MCNC: 11 G/DL (ref 14–18)
IMM GRANULOCYTES # BLD AUTO: 0.02 K/UL (ref 0–0.04)
IMM GRANULOCYTES NFR BLD AUTO: 0.5 % (ref 0–0.5)
LYMPHOCYTES # BLD AUTO: 0.9 K/UL (ref 1–4.8)
LYMPHOCYTES NFR BLD: 21.3 % (ref 18–48)
MAGNESIUM SERPL-MCNC: 1.4 MG/DL (ref 1.6–2.6)
MCH RBC QN AUTO: 35.3 PG (ref 27–31)
MCHC RBC AUTO-ENTMCNC: 33.5 G/DL (ref 32–36)
MCV RBC AUTO: 105 FL (ref 82–98)
MONOCYTES # BLD AUTO: 0.5 K/UL (ref 0.3–1)
MONOCYTES NFR BLD: 12.1 % (ref 4–15)
NEUTROPHILS # BLD AUTO: 2.6 K/UL (ref 1.8–7.7)
NEUTROPHILS NFR BLD: 64.4 % (ref 38–73)
NRBC BLD-RTO: 0 /100 WBC
PHOSPHATE SERPL-MCNC: 1.5 MG/DL (ref 2.7–4.5)
PLATELET # BLD AUTO: 81 K/UL (ref 150–350)
PMV BLD AUTO: 11.1 FL (ref 9.2–12.9)
POCT GLUCOSE: 120 MG/DL (ref 70–110)
POCT GLUCOSE: 134 MG/DL (ref 70–110)
POCT GLUCOSE: 161 MG/DL (ref 70–110)
POCT GLUCOSE: 197 MG/DL (ref 70–110)
POTASSIUM SERPL-SCNC: 3.3 MMOL/L (ref 3.5–5.1)
PROT SERPL-MCNC: 6.9 G/DL (ref 6–8.4)
RBC # BLD AUTO: 3.12 M/UL (ref 4.6–6.2)
SODIUM SERPL-SCNC: 141 MMOL/L (ref 136–145)
WBC # BLD AUTO: 4.04 K/UL (ref 3.9–12.7)

## 2020-08-05 PROCEDURE — 25000003 PHARM REV CODE 250: Performed by: STUDENT IN AN ORGANIZED HEALTH CARE EDUCATION/TRAINING PROGRAM

## 2020-08-05 PROCEDURE — 83735 ASSAY OF MAGNESIUM: CPT

## 2020-08-05 PROCEDURE — 11000001 HC ACUTE MED/SURG PRIVATE ROOM

## 2020-08-05 PROCEDURE — 63600175 PHARM REV CODE 636 W HCPCS: Performed by: STUDENT IN AN ORGANIZED HEALTH CARE EDUCATION/TRAINING PROGRAM

## 2020-08-05 PROCEDURE — 99406 BEHAV CHNG SMOKING 3-10 MIN: CPT | Mod: S$GLB,,,

## 2020-08-05 PROCEDURE — 63600175 PHARM REV CODE 636 W HCPCS: Performed by: INTERNAL MEDICINE

## 2020-08-05 PROCEDURE — 97161 PT EVAL LOW COMPLEX 20 MIN: CPT

## 2020-08-05 PROCEDURE — 97165 OT EVAL LOW COMPLEX 30 MIN: CPT

## 2020-08-05 PROCEDURE — 84100 ASSAY OF PHOSPHORUS: CPT

## 2020-08-05 PROCEDURE — 97530 THERAPEUTIC ACTIVITIES: CPT

## 2020-08-05 PROCEDURE — 85025 COMPLETE CBC W/AUTO DIFF WBC: CPT

## 2020-08-05 PROCEDURE — 94761 N-INVAS EAR/PLS OXIMETRY MLT: CPT

## 2020-08-05 PROCEDURE — 36415 COLL VENOUS BLD VENIPUNCTURE: CPT

## 2020-08-05 PROCEDURE — 99406 PT REFUSED TOBACCO CESSATION: ICD-10-PCS | Mod: S$GLB,,,

## 2020-08-05 PROCEDURE — 80053 COMPREHEN METABOLIC PANEL: CPT

## 2020-08-05 RX ORDER — LISINOPRIL 5 MG/1
10 TABLET ORAL DAILY
Status: DISCONTINUED | OUTPATIENT
Start: 2020-08-05 | End: 2020-08-08 | Stop reason: HOSPADM

## 2020-08-05 RX ORDER — GABAPENTIN 100 MG/1
200 CAPSULE ORAL 3 TIMES DAILY
Status: DISCONTINUED | OUTPATIENT
Start: 2020-08-05 | End: 2020-08-06

## 2020-08-05 RX ORDER — POTASSIUM CHLORIDE 20 MEQ/1
40 TABLET, EXTENDED RELEASE ORAL ONCE
Status: COMPLETED | OUTPATIENT
Start: 2020-08-05 | End: 2020-08-05

## 2020-08-05 RX ADMIN — LISINOPRIL 10 MG: 5 TABLET ORAL at 10:08

## 2020-08-05 RX ADMIN — DIAZEPAM 10 MG: 5 TABLET ORAL at 05:08

## 2020-08-05 RX ADMIN — GABAPENTIN 200 MG: 100 CAPSULE ORAL at 08:08

## 2020-08-05 RX ADMIN — GABAPENTIN 200 MG: 100 CAPSULE ORAL at 10:08

## 2020-08-05 RX ADMIN — DIAZEPAM 10 MG: 5 TABLET ORAL at 11:08

## 2020-08-05 RX ADMIN — POTASSIUM CHLORIDE 40 MEQ: 1500 TABLET, EXTENDED RELEASE ORAL at 10:08

## 2020-08-05 RX ADMIN — ENOXAPARIN SODIUM 40 MG: 100 INJECTION SUBCUTANEOUS at 05:08

## 2020-08-05 RX ADMIN — LORAZEPAM 3 MG: 2 INJECTION INTRAMUSCULAR; INTRAVENOUS at 07:08

## 2020-08-05 RX ADMIN — GABAPENTIN 200 MG: 100 CAPSULE ORAL at 03:08

## 2020-08-05 RX ADMIN — THERA TABS 1 TABLET: TAB at 10:08

## 2020-08-05 RX ADMIN — LORAZEPAM 3 MG: 2 INJECTION INTRAMUSCULAR; INTRAVENOUS at 03:08

## 2020-08-05 RX ADMIN — LORAZEPAM 3 MG: 2 INJECTION INTRAMUSCULAR; INTRAVENOUS at 12:08

## 2020-08-05 RX ADMIN — FOLIC ACID 1 MG: 1 TABLET ORAL at 10:08

## 2020-08-05 RX ADMIN — Medication 100 MG: at 10:08

## 2020-08-05 NOTE — PLAN OF CARE
Rounded on patient  Patient AAOx3, on oxygen, weak, worked with PT/OT, his hands are shaking. Nurse in room giving meds.    Lives at home with mother. Discussed ETOH cessation. Patient states he wants to go back to AA meetings.     Barrier to discharge: not medically ready        08/05/20 1111   Discharge Reassessment   Assessment Type Discharge Planning Reassessment   Provided patient/caregiver education on the expected discharge date and the discharge plan No   Do you have any problems affording any of your prescribed medications? No   Discharge Plan A Home;Home with family   DME Needed Upon Discharge  none   Anticipated Discharge Disposition Home   Can the patient/caregiver answer the patient profile reliably? Yes, cognitively intact   How does the patient rate their overall health at the present time? Fair   Describe the patient's ability to walk at the present time. No restrictions     Aure Olivia, RN, CCM, CMSRN  RN Transition Navigator  [phone]

## 2020-08-05 NOTE — PLAN OF CARE
Problem: Occupational Therapy Goal  Goal: Occupational Therapy Goal  Description: Goals to be met by: 09/05     Patient will increase functional independence with ADLs by performing:    UE Dressing with Stand-by Assistance.  LE Dressing with Stand-by Assistance.  Grooming while standing at sink with Stand-by Assistance.  Toileting from toilet with Stand-by Assistance for hygiene and clothing management.   Toilet transfer to toilet with Contact Guard Assistance.  Increased functional strength to WFL for ADLs.    Outcome: Ongoing, Progressing   Pt found in supine & required mod encouragement for participation in OT eval/tx this AM.  Pt perf the following: sup-->EOB w/ CGA & increased time for completion; tolerated sitting EOB x ~10min w/ post LOB x 6 w/ Max A to recover; standing via RW w/ Mod A to achieve/maint 2/2 DTs & post lean; sidestepping L via RW w/ Mod A for balance & Min A for DME mgmt. Pt returned to supine w/ Mod A. Edu/tx re: general safety techs & HEP. Pt/mother verbalized understanding.    Pt presents w/ decreased overall endurance/conditioning, balance/mobility & coordination w/ subsequent decline in (I)/safety w/ BADLs, fxnl mobility & fxnl t/f's.  OT 5x/wk to increase phys/fxnl status & maximize potential to achieve established goals for d/c-->substance abuse rehab facility (in patient) w/ DME recs TBD pending progress.

## 2020-08-05 NOTE — PLAN OF CARE
VN rounds completed.  The patient has  complaints of thigh pain that he is rating 10/10 at this time. He is also asking for soup.  Floor nurse notified.  Patient reminded of fall precautions and verbalized understanding.

## 2020-08-05 NOTE — PT/OT/SLP EVAL
Physical Therapy Evaluation and Treatment    Patient Name:  Abe Cook   MRN:  [id number]    Recommendations:     Discharge Recommendations:  (ongoing assessment)   Discharge Equipment Recommendations: (TBD)   Barriers to discharge: Decreased caregiver support    Assessment:     Abe Coko is a 50 y.o. male admitted with a medical diagnosis of Alcohol withdrawal delirium, acute, hyperactive.  He presents with the following impairments/functional limitations:  weakness, impaired endurance, impaired self care skills, impaired functional mobilty, gait instability, impaired balance, impaired cognition, decreased coordination, decreased upper extremity function, decreased lower extremity function, decreased safety awareness, decreased ROM, impaired coordination, impaired fine motor. Pt with significant UE/trunk tremors leading to instability and impaired functional mobility. Pt required mod A to supine>sit transition and stand at RW ranged from min A to max A. Only able to safely take side steps this date with min/mod A, not safe to ambulate away from bed this date. Recommendations ongoing pending pt's progress with mobility.    Rehab Prognosis: Good; patient would benefit from acute skilled PT services to address these deficits and reach maximum level of function.    Recent Surgery: * No surgery found *      Plan:     During this hospitalization, patient to be seen 5 x/week to address the identified rehab impairments via gait training, therapeutic activities, therapeutic exercises, neuromuscular re-education and progress toward the following goals:    · Plan of Care Expires:  09/05/20    Subjective     Chief Complaint: UE tremors  Patient/Family Comments/goals: pt reporting having a hard time holding cup of water or placing it back on table  Pain/Comfort:  · Pain Rating 1: (not rated)    Patients cultural, spiritual, Rastafarian conflicts given the current situation: no    Living Environment:  Pt lives alone in a [facility]  with 4-5 CASSANDRA with B rails and tub/shower combo.  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  Equipment used at home: none.  DME owned (not currently used): none.  Upon discharge, patient will have assistance from unsure of level of assist.    Objective:     Communicated with nurse Sosa prior to session.  Patient found HOB elevated with bed alarm, telemetry  upon PT entry to room.    General Precautions: Standard, fall, seizure   Orthopedic Precautions:N/A   Braces: N/A     Exams:  · Cognitive Exam:  Patient is oriented to Person, Place, Time, Situation and pt reporting he has been here for 1 month  · Fine Motor Coordination:    · -       Impaired  moderate UE tremors with movement and rest (LUE>RUE)  · Gross Motor Coordination:  UE and trunk tremors with movement  · Postural Exam:  Patient presented with the following abnormalities:    · -       Rounded shoulders  · -       Forward head  · Sensation:    · -       Intact  · Skin Integrity/Edema:      · -       Skin integrity: abrasions on BLEs/UEs and over R eyebrow  · RLE ROM: WFL  · RLE Strength: grossly 4/5  · LLE ROM: WFL except knee/hip flexion limited by pain  · LLE Strength: 3-/5 grossly    Functional Mobility:  · Bed Mobility:     · Scooting: maximal assistance and of 2 persons  · Supine to Sit: moderate assistance  · Sit to Supine: moderate assistance  · Transfers:     · Sit to Stand:  moderate, maximum, and minimum assistance with rolling walker  · Gait: 3-4 scoot steps left with RW and mod A on first bout adn min A on 2nd bout. Pt with posterior leaning onto bed and knee buckling during 1st bout of ambulation, improved on final stand.    Therapeutic Activities and Exercises:  Pt with significant tremoring with movements and requiring increased time to complete mobility.  Pt with posterior leaning and feet raising off floor requiring  moderate cueing to maintain feet on floor and to lean forward.  Completed 3 stands requiring varying levels of assist:  Moderate assist for 1st stand, max A to 2nd, and min A for 3rd stand. Completed side steps on 2 stand and required mod A then min A. 1st stand pt with B knee buckling and pt quickly returning to bed. Unsafe to ambulate away from bed this date.  Returned to supine and educated on supine LE exercises.    AM-PAC 6 CLICK MOBILITY  Total Score:11     Patient left HOB elevated with all lines intact, call button in reach, bed alarm on and nurse notified.    GOALS:   Multidisciplinary Problems     Physical Therapy Goals        Problem: Physical Therapy Goal    Goal Priority Disciplines Outcome Goal Variances Interventions   Physical Therapy Goal     PT, PT/OT Ongoing, Progressing     Description: Goals to be met by: 20     Patient will increase functional independence with mobility by performin. Supine <> sit with Modified [place]  2. Sit to stand transfer with Modified [place]  3. Bed to chair transfer with Modified [place] using appropriate AD  4. Gait  x 150 feet with mod I using appropriate AD  5. Lower extremity exercise program x 10 reps per handout, with supervision                     History:     Past Medical History:   Diagnosis Date    Diabetes mellitus        Past Surgical History:   Procedure Laterality Date    BACK SURGERY      KNEE SURGERY         Time Tracking:     PT Received On: 20  PT Start Time: 1011     PT Stop Time: 1044  PT Total Time (min): 33 min     Billable Minutes: Evaluation 10 and Therapeutic Activity 23      Yesy Jung, PT  2020

## 2020-08-05 NOTE — PLAN OF CARE
POC reviewed, patient verbalize understanding. Patient complained of pain and swelling to left knee, applied cold compress. Patient had hallucination and visible tremors administered prn ativan as charted. Sinus tach on tele monitor. Fall precaution maintained: bed on lowest position, call light within reach, bed alarm set, side rail up x 2, non skid sock on. Will continue to monitor.

## 2020-08-05 NOTE — PROGRESS NOTES
[facility] Resident HO-4 Progress Note    Subjective:      Abe Cook is a 50 y.o.  male who is being followed by the [facility] service at [facility] for EtOH withdrawal. Overnight, stepped down to the floor.  This morning he is resting comfortably in bed. Still jittery when aroused. Denies specific complaints of HA, CP, SOB. Required PRN ativan x4.     Objective:   Last 24 Hour Vital Signs:  BP  Min: 88/56  Max: 156/105  Temp  Av.8 °F (37.1 °C)  Min: 97.6 °F (36.4 °C)  Max: 100 °F (37.8 °C)  Pulse  Av.4  Min: 70  Max: 132  Resp  Av.1  Min: 12  Max: 22  SpO2  Av.6 %  Min: 90 %  Max: 99 %  I/O last 3 completed shifts:  In: 4733.4 [P.O.:290; I.V.:4343.4; IV Piggyback:100]  Out: 2700 [Urine:2700]    Physical Examination:  GEN: alert, answers questions appropriately, no ditress  HEENT: NC, superficial abrasion to right eye brow, no conjunctival injection, no rhinorhea  RESP: CTAB, no wheezes  CV: RRR, no murmurs, cap refill 3 seconds  ABD: BS present, soft, NT, ND   MSK: no deformities, no TTP  DERM: no rashes, multiple scabs over knuckles and palms  NEURO: A&O, no facial asymmetry, motor function grossly intact    Laboratory:  Laboratory Data Reviewed: yes  Pertinent Findings:  Recent Labs   Lab 20  1748 20  0326 20  0415   WBC 5.00 4.31 4.04   HGB 11.3* 10.8* 11.0*   HCT 33.1* 31.9* 32.8*   * 91* 81*   * 106* 105*   RDW 12.7 12.6 12.2    141 141   K 3.4* 3.4* 3.3*    107 108   CO2 17* 23 24   BUN 6 7 9   CREATININE 1.3 1.3 1.5*   * 170* 130*   PROT 6.6 6.9 6.9   ALBUMIN 3.0* 3.0* 2.9*   BILITOT 0.7 0.9 1.4*   * 171* 113*   ALKPHOS 164* 152* 148*   ALT 63* 54* 45*     Mg 1.4, Phos 1.5    Microbiology Data Reviewed: yes  Pertinent Findings:  None    Other Results:  EKG (my interpretation): None new    Radiology Data Reviewed: yes  Pertinent Findings:  None new    Current Medications:     Infusions:        "Scheduled:   diazePAM  10 mg Oral Q6H    enoxaparin  40 mg Subcutaneous Q24H    folic acid  1 mg Oral Daily    gabapentin  200 mg Oral TID    multivitamin  1 tablet Oral Daily    potassium chloride  40 mEq Oral Once    thiamine  100 mg Oral Daily        PRN:  lorazepam, sodium chloride 0.9%    Antibiotics and Day Number of Therapy:  None    Lines and Day Number of Therapy:  PIV 8/4-    Assessment:     Abe Cook is a 50 y.o. male with alcohol dependence, HTN, HLD, and T2DM who presents with EtOH wiithdrawal     Plan:     #Alcohol withdrawal  #Unintentional weight loss  #Fall  #Transaminitis  -Patient drinks daily for many months, recently increased during COVID quarantine, endorses approximately 20 lbs weight loss over this time  -Last drink was morning of admission  -Patient counseled on the health benefits of alcohol cessation, currently action stage of change, states "I don't want to drink when I leave the hospital"   -tripped and fell day of admission with negative head/neck imaging, superficial scrapes treated in ED  -stepped down from ICU to floor now that off Precedex  -Current regimen: Valium 10 mg q6hr, Gabapentin 200 TID, Ativan 3 mg IV q1hr PRN (required x4 overnight)       #Macrocytic anemia  -likely related to malnutrition and EtOH abuse  -Around historical Hgb baseline at 11, MCV higher than in the past at 105  -Iron studies consistent with chronic inflammation, folate and B12 normal  -Thiamine, folate and MVI     #Thrombocytopenia  -likely related to EtOH abuse  -trend downward 111->91->81, continue to trend  -will plan to hold VTE PPX if <50     #AGMA, resolved  #Electrolyte derrangemnt  -Likely related to acute on chronic EtOH abuse and poor nutritional status   -Trend and replete PRN     #T2DM not on long-term insulin  -A1C 5.3 this admission  -Holding home metformin  -SSI while inpatient     #HLD  -holding home statin     #HTN  -restarting home lisinoprol     Diet: Diabetic  PPX: " Lovenox  FULL CODE     Dispo: pending improvement of EtOH withdrawal symptoms, initiate PT/OT today    Liam Gómez  [facility] Internal Medicine HO-4    [facility] Medicine Hospitalist Pager numbers:   [facility] Hospitalist Medicine Team A (Andi/Khalida): [phone]  [facility] Hospitalist Medicine Team B (Jens/Barbie):  [phone]

## 2020-08-05 NOTE — PT/OT/SLP EVAL
Occupational Therapy   Evaluation/tx    Name: Abe Cook  MRN: [id number]  Admitting Diagnosis:  Alcohol withdrawal delirium, acute, hyperactive      Recommendations:     Discharge Recommendations: substance abuse facility  Discharge Equipment Recommendations:  (TBD pending progress)  Barriers to discharge:  Decreased caregiver support    Assessment:   Pt presents w/ decreased overall endurance/conditioning, balance/mobility & coordination w/ subsequent decline in (I)/safety w/ BADLs, fxnl mobility & fxnl t/f's.  OT 5x/wk to increase phys/fxnl status & maximize potential to achieve established goals for d/c-->substance abuse rehab facility (in patient) w/ DME recs TBD pending progress.    Abe Cook is a 50 y.o. male with a medical diagnosis of Alcohol withdrawal delirium, acute, hyperactive.  He presents with . Performance deficits affecting function: weakness, impaired endurance, impaired self care skills, impaired functional mobilty, gait instability, decreased lower extremity function, decreased upper extremity function, decreased coordination, impaired balance, decreased safety awareness, pain, decreased ROM, impaired cardiopulmonary response to activity, impaired skin.      Rehab Prognosis: Good; patient would benefit from acute skilled OT services to address these deficits and reach maximum level of function.       Plan:     Patient to be seen 5 x/week to address the above listed problems via self-care/home management, therapeutic activities, therapeutic exercises  · Plan of Care Expires: 09/05/20  · Plan of Care Reviewed with: patient, mother    Subjective     Chief Complaint: syncope w/ fall  Patient/Family Comments/goals: to return home alone    Occupational Profile:  Living Environment: alone in [facility] w/ THE; t/s  Previous level of function: (I) w/o amb DME  Roles and Routines: alcohol drinking QD; standing tub showers--mother does IADLs  Equipment Used at Home:  none  Assistance upon Discharge: 24/7  "sup/asst at substance abuse in pt facility    Pain/Comfort:  · Pain Rating 1: (unrated)  · Location - Side 1: Bilateral  · Location - Orientation 1: generalized  · Location 1: ("whole body")  · Pain Addressed 1: Pre-medicate for activity, Reposition, Distraction  · Pain Rating Post-Intervention 1: (unrated)    Patients cultural, spiritual, Roman Catholic conflicts given the current situation:      Objective:     Communicated with: nsg prior to session.  Patient found HOB elevated with bed alarm, telemetry upon OT entry to room.    General Precautions: Standard, fall, seizure, respiratory   Orthopedic Precautions:N/A   Braces: N/A     Occupational Performance:    Bed Mobility:    · Patient completed Supine to Sit with contact guard assistance and & increased time for completion  · Patient completed Sit to Supine with moderate assistance    Functional Mobility/Transfers:  · Patient completed Sit <> Stand Transfer with moderate assistance  with  rolling walker   · Functional Mobility: sidestep L via RW w/ Mod A to maint balance & Min A for DME mgmt    Activities of Daily Living:  · Lower Body Dressing: maximal assistance don socks    Cognitive/Visual Perceptual:  AO4  Delayed processing  Mild confusion  Limited insight into condition    Physical Exam:  B shldr AAROM to WFL; elb-->Ds WFL  **Resting tremors B U/LEs 2/2 DTs    Sit balance: occasional moments of CGA, but post LOB x 6 requiring Max A to recover  Stand balance: P+    AMPAC 6 Click ADL:  AMPAC Total Score: 13    Treatment & Education:  Pt found in supine & required mod encouragement for participation in OT eval/tx this AM.  Pt perf the following: sup-->EOB w/ CGA & increased time for completion; tolerated sitting EOB x ~10min w/ post LOB x 6 w/ Max A to recover; standing via RW w/ Mod A to achieve/maint 2/2 DTs & post lean; sidestepping L via RW w/ Mod A for balance & Min A for DME mgmt. Pt returned to supine w/ Mod A. Edu/tx re: general safety techs & HEP. " Pt/mother verbalized understanding.    Patient left HOB elevated with all lines intact, call button in reach, bed alarm on, nsg notified and mother present    GOALS:   Multidisciplinary Problems     Occupational Therapy Goals        Problem: Occupational Therapy Goal    Goal Priority Disciplines Outcome Interventions   Occupational Therapy Goal     OT, PT/OT Ongoing, Progressing    Description: Goals to be met by: 09/05     Patient will increase functional independence with ADLs by performing:    UE Dressing with Stand-by Assistance.  LE Dressing with Stand-by Assistance.  Grooming while standing at sink with Stand-by Assistance.  Toileting from toilet with Stand-by Assistance for hygiene and clothing management.   Toilet transfer to toilet with Contact Guard Assistance.  Increased functional strength to WFL for ADLs.                     History:     Past Medical History:   Diagnosis Date    Diabetes mellitus        Past Surgical History:   Procedure Laterality Date    BACK SURGERY      KNEE SURGERY         Time Tracking:     OT Date of Treatment: 08/05/20  OT Start Time: 1047  OT Stop Time: 1111  OT Total Time (min): 24 min    Billable Minutes:Evaluation 10  Therapeutic Activity 14  Total Time 24    ROSE MARY Begum  8/5/2020

## 2020-08-05 NOTE — PROGRESS NOTES
Smoking cessation education follow-up.  Pt states that he has not smoked in several years and is not experiencing nicotine withdrawal symptoms.

## 2020-08-05 NOTE — PLAN OF CARE
Problem: Physical Therapy Goal  Goal: Physical Therapy Goal  Description: Goals to be met by: 20     Patient will increase functional independence with mobility by performin. Supine <> sit with Modified [place]  2. Sit to stand transfer with Modified [place]  3. Bed to chair transfer with Modified [place] using appropriate AD  4. Gait  x 150 feet with mod I using appropriate AD  5. Lower extremity exercise program x 10 reps per handout, with supervision    Outcome: Ongoing, Progressing     Pt with significant UE/trunk tremors leading to instability and impaired functional mobility. Pt required mod A to supine>sit transition and stand at RW ranged from min A to max A. Only able to safely take side steps this date with min/mod A, not safe to ambulate away from bed this date. Recommendations ongoing pending pt's progress with mobility.

## 2020-08-06 LAB
ALBUMIN SERPL BCP-MCNC: 2.8 G/DL (ref 3.5–5.2)
ALP SERPL-CCNC: 148 U/L (ref 55–135)
ALT SERPL W/O P-5'-P-CCNC: 42 U/L (ref 10–44)
ANION GAP SERPL CALC-SCNC: 8 MMOL/L (ref 8–16)
AST SERPL-CCNC: 107 U/L (ref 10–40)
BASOPHILS # BLD AUTO: 0.03 K/UL (ref 0–0.2)
BASOPHILS NFR BLD: 0.8 % (ref 0–1.9)
BILIRUB SERPL-MCNC: 1.4 MG/DL (ref 0.1–1)
BUN SERPL-MCNC: 9 MG/DL (ref 6–20)
CALCIUM SERPL-MCNC: 8.9 MG/DL (ref 8.7–10.5)
CHLORIDE SERPL-SCNC: 107 MMOL/L (ref 95–110)
CO2 SERPL-SCNC: 27 MMOL/L (ref 23–29)
CREAT SERPL-MCNC: 1.4 MG/DL (ref 0.5–1.4)
DIFFERENTIAL METHOD: ABNORMAL
EOSINOPHIL # BLD AUTO: 0.1 K/UL (ref 0–0.5)
EOSINOPHIL NFR BLD: 1.8 % (ref 0–8)
ERYTHROCYTE [DISTWIDTH] IN BLOOD BY AUTOMATED COUNT: 12.1 % (ref 11.5–14.5)
EST. GFR  (AFRICAN AMERICAN): >60 ML/MIN/1.73 M^2
EST. GFR  (NON AFRICAN AMERICAN): 58 ML/MIN/1.73 M^2
GLUCOSE SERPL-MCNC: 130 MG/DL (ref 70–110)
HCT VFR BLD AUTO: 34.5 % (ref 40–54)
HGB BLD-MCNC: 11.3 G/DL (ref 14–18)
IMM GRANULOCYTES # BLD AUTO: 0.02 K/UL (ref 0–0.04)
IMM GRANULOCYTES NFR BLD AUTO: 0.5 % (ref 0–0.5)
LYMPHOCYTES # BLD AUTO: 0.8 K/UL (ref 1–4.8)
LYMPHOCYTES NFR BLD: 19.8 % (ref 18–48)
MAGNESIUM SERPL-MCNC: 1.5 MG/DL (ref 1.6–2.6)
MCH RBC QN AUTO: 35.1 PG (ref 27–31)
MCHC RBC AUTO-ENTMCNC: 32.8 G/DL (ref 32–36)
MCV RBC AUTO: 107 FL (ref 82–98)
MONOCYTES # BLD AUTO: 0.5 K/UL (ref 0.3–1)
MONOCYTES NFR BLD: 11.7 % (ref 4–15)
NEUTROPHILS # BLD AUTO: 2.6 K/UL (ref 1.8–7.7)
NEUTROPHILS NFR BLD: 65.4 % (ref 38–73)
NRBC BLD-RTO: 0 /100 WBC
PHOSPHATE SERPL-MCNC: 2.6 MG/DL (ref 2.7–4.5)
PLATELET # BLD AUTO: 105 K/UL (ref 150–350)
PLATELET BLD QL SMEAR: ABNORMAL
PMV BLD AUTO: 10.9 FL (ref 9.2–12.9)
POCT GLUCOSE: 141 MG/DL (ref 70–110)
POCT GLUCOSE: 147 MG/DL (ref 70–110)
POTASSIUM SERPL-SCNC: 3.8 MMOL/L (ref 3.5–5.1)
PROT SERPL-MCNC: 6.9 G/DL (ref 6–8.4)
RBC # BLD AUTO: 3.22 M/UL (ref 4.6–6.2)
SODIUM SERPL-SCNC: 142 MMOL/L (ref 136–145)
WBC # BLD AUTO: 3.94 K/UL (ref 3.9–12.7)

## 2020-08-06 PROCEDURE — 97530 THERAPEUTIC ACTIVITIES: CPT

## 2020-08-06 PROCEDURE — 84100 ASSAY OF PHOSPHORUS: CPT

## 2020-08-06 PROCEDURE — 83735 ASSAY OF MAGNESIUM: CPT

## 2020-08-06 PROCEDURE — 25000003 PHARM REV CODE 250: Performed by: STUDENT IN AN ORGANIZED HEALTH CARE EDUCATION/TRAINING PROGRAM

## 2020-08-06 PROCEDURE — 99900035 HC TECH TIME PER 15 MIN (STAT)

## 2020-08-06 PROCEDURE — 36415 COLL VENOUS BLD VENIPUNCTURE: CPT

## 2020-08-06 PROCEDURE — 94761 N-INVAS EAR/PLS OXIMETRY MLT: CPT

## 2020-08-06 PROCEDURE — 85025 COMPLETE CBC W/AUTO DIFF WBC: CPT

## 2020-08-06 PROCEDURE — 80053 COMPREHEN METABOLIC PANEL: CPT

## 2020-08-06 PROCEDURE — 11000001 HC ACUTE MED/SURG PRIVATE ROOM

## 2020-08-06 PROCEDURE — 63600175 PHARM REV CODE 636 W HCPCS: Performed by: STUDENT IN AN ORGANIZED HEALTH CARE EDUCATION/TRAINING PROGRAM

## 2020-08-06 PROCEDURE — 97116 GAIT TRAINING THERAPY: CPT

## 2020-08-06 RX ORDER — GABAPENTIN 100 MG/1
100 CAPSULE ORAL 3 TIMES DAILY
Status: DISCONTINUED | OUTPATIENT
Start: 2020-08-06 | End: 2020-08-08 | Stop reason: HOSPADM

## 2020-08-06 RX ORDER — ACETAMINOPHEN 325 MG/1
325 TABLET ORAL ONCE AS NEEDED
Status: COMPLETED | OUTPATIENT
Start: 2020-08-06 | End: 2020-08-06

## 2020-08-06 RX ORDER — DIAZEPAM 5 MG/1
10 TABLET ORAL EVERY 8 HOURS
Status: DISCONTINUED | OUTPATIENT
Start: 2020-08-06 | End: 2020-08-08 | Stop reason: HOSPADM

## 2020-08-06 RX ORDER — ACETAMINOPHEN 325 MG/1
650 TABLET ORAL ONCE AS NEEDED
Status: DISCONTINUED | OUTPATIENT
Start: 2020-08-06 | End: 2020-08-06

## 2020-08-06 RX ORDER — MAGNESIUM SULFATE HEPTAHYDRATE 40 MG/ML
2 INJECTION, SOLUTION INTRAVENOUS ONCE
Status: COMPLETED | OUTPATIENT
Start: 2020-08-06 | End: 2020-08-06

## 2020-08-06 RX ADMIN — GABAPENTIN 100 MG: 100 CAPSULE ORAL at 02:08

## 2020-08-06 RX ADMIN — GABAPENTIN 100 MG: 100 CAPSULE ORAL at 09:08

## 2020-08-06 RX ADMIN — ACETAMINOPHEN 325 MG: 325 TABLET ORAL at 12:08

## 2020-08-06 RX ADMIN — THERA TABS 1 TABLET: TAB at 09:08

## 2020-08-06 RX ADMIN — LORAZEPAM 2 MG: 2 INJECTION INTRAMUSCULAR; INTRAVENOUS at 08:08

## 2020-08-06 RX ADMIN — DIAZEPAM 10 MG: 5 TABLET ORAL at 10:08

## 2020-08-06 RX ADMIN — ENOXAPARIN SODIUM 40 MG: 100 INJECTION SUBCUTANEOUS at 05:08

## 2020-08-06 RX ADMIN — DIAZEPAM 10 MG: 5 TABLET ORAL at 05:08

## 2020-08-06 RX ADMIN — DIAZEPAM 10 MG: 5 TABLET ORAL at 02:08

## 2020-08-06 RX ADMIN — Medication 100 MG: at 09:08

## 2020-08-06 RX ADMIN — LISINOPRIL 10 MG: 5 TABLET ORAL at 09:08

## 2020-08-06 RX ADMIN — MAGNESIUM SULFATE IN WATER 2 G: 40 INJECTION, SOLUTION INTRAVENOUS at 09:08

## 2020-08-06 RX ADMIN — LORAZEPAM 2 MG: 2 INJECTION INTRAMUSCULAR; INTRAVENOUS at 11:08

## 2020-08-06 RX ADMIN — GABAPENTIN 100 MG: 100 CAPSULE ORAL at 08:08

## 2020-08-06 RX ADMIN — FOLIC ACID 1 MG: 1 TABLET ORAL at 09:08

## 2020-08-06 NOTE — PLAN OF CARE
Problem: Physical Therapy Goal  Goal: Physical Therapy Goal  Description: Goals to be met by: 20     Patient will increase functional independence with mobility by performin. Supine <> sit with Modified [place]  2. Sit to stand transfer with Modified [place]  3. Bed to chair transfer with Modified [place] using appropriate AD  4. Gait  x 150 feet with mod I using appropriate AD  5. Lower extremity exercise program x 10 reps per handout, with supervision    Outcome: Ongoing, Progressing      Pt presented with mild tremors this date, improved from yesterday's session but still limiting pt's self care and mobility. Pt ambulated 20 ft with RW and minimal assist, but required max verbal cues for RW management. Pt able to perform bed transfers with SBA, but requires verbal cues for hand and foot placement. Pt requires further assessment to make d/c recommendation.

## 2020-08-06 NOTE — PT/OT/SLP PROGRESS
Occupational Therapy  Missed Visit    Patient Name:  Abe Cook   MRN:  [id number]    Patient not seen today secondary to Patient unwilling to participate. Will follow-up as able.    ROSE MARY Begum  8/6/2020

## 2020-08-06 NOTE — PROGRESS NOTES
[employer] Medicine Resident HO-4 Progress Note    Subjective:      Abe Cook is a 50 y.o.  male who is being followed by the [facility] Medicine service at [facility] for EtOH withdrawal.     This morning he is resting comfortably in bed. Less jittery when aroused this morning. Denies specific complaints of HA, CP, SOB. Required PRN ativan x2. Discussing inpatient vs outpatient EtOH rehab. Patient states he wants to go back to AA meetings.      Objective:   Last 24 Hour Vital Signs:  BP  Min: 118/85  Max: 156/105  Temp  Av °F (36.7 °C)  Min: 97.5 °F (36.4 °C)  Max: 98.7 °F (37.1 °C)  Pulse  Av.3  Min: 92  Max: 121  Resp  Av.4  Min: 17  Max: 22  SpO2  Av %  Min: 95 %  Max: 99 %  I/O last 3 completed shifts:  In: 1630 [P.O.:1630]  Out: 2501 [Urine:2500; Stool:1]    Physical Examination:  GEN: alert, answers questions appropriately, no distress  HEENT: NC, superficial abrasion to right eye brow, no conjunctival injection, no rhinorhea  RESP: CTAB, no wheezes  CV: RRR, no murmurs, cap refill 3 seconds  ABD: BS present, soft, NT, ND   MSK: no deformities, no TTP  DERM: no rashes, multiple scabs over knuckles and palms  NEURO: A&O, no facial asymmetry, motor function grossly intact    Laboratory:  Laboratory Data Reviewed: yes  Pertinent Findings:  Recent Labs   Lab 20  0326 20  0415 20  0339   WBC 4.31 4.04 3.94   HGB 10.8* 11.0* 11.3*   HCT 31.9* 32.8* 34.5*   PLT 91* 81* 105*   * 105* 107*   RDW 12.6 12.2 12.1    141 142   K 3.4* 3.3* 3.8    108 107   CO2 23 24 27   BUN 7 9 9   CREATININE 1.3 1.5* 1.4   * 130* 130*   PROT 6.9 6.9 6.9   ALBUMIN 3.0* 2.9* 2.8*   BILITOT 0.9 1.4* 1.4*   * 113* 107*   ALKPHOS 152* 148* 148*   ALT 54* 45* 42     Mg 1.5, Phos 2.6    Microbiology Data Reviewed: yes  Pertinent Findings:  None    Other Results:  EKG (my interpretation): None new    Radiology Data Reviewed: yes  Pertinent Findings:  None  "new    Current Medications:     Infusions:       Scheduled:   diazePAM  10 mg Oral Q8H    enoxaparin  40 mg Subcutaneous Q24H    folic acid  1 mg Oral Daily    gabapentin  100 mg Oral TID    lisinopriL  10 mg Oral Daily    magnesium sulfate IVPB  2 g Intravenous Once    multivitamin  1 tablet Oral Daily    thiamine  100 mg Oral Daily        PRN:  lorazepam, sodium chloride 0.9%    Antibiotics and Day Number of Therapy:  None    Lines and Day Number of Therapy:  PIV 8/4-    Assessment:     Abe Cook is a 50 y.o. male with alcohol dependence, HTN, HLD, and T2DM who presents with EtOH wiithdrawal     Plan:     #Alcohol withdrawal  #Unintentional weight loss  #Fall  #Transaminitis  -Patient drinks daily for many months, recently increased during COVID quarantine, endorses approximately 20 lbs weight loss over this time  -Last drink was morning of admission  -tripped and fell day of admission with negative head/neck imaging, superficial scrapes treated in ED  -Patient counseled on the health benefits of alcohol cessation, currently action stage of change, states "I don't want to drink when I leave the hospital"   -Current regimen: Valium 10 mg q8hr, Gabapentin 100 TID, Ativan 2 mg IV q4hr PRN (required x2 overnight)    #Macrocytic anemia  -likely related to malnutrition and EtOH abuse  -Around historical Hgb baseline at 11, MCV higher than in the past at 105  -Iron studies consistent with chronic inflammation, folate and B12 normal  -Thiamine, folate and MVI     #Thrombocytopenia  -likely related to EtOH abuse  -, trending up  -will plan to hold VTE PPX if <50     #AGMA, resolved  #Electrolyte derrangemnt  -Likely related to acute on chronic EtOH abuse and poor nutritional status   -Trend and replete PRN     #T2DM not on long-term insulin  -A1C 5.3 this admission  -Holding home metformin  -SSI while inpatient     #HLD  -holding home statin     #HTN  -continue home lisinoprol     Diet: Diabetic  PPX: " Lovenox  FULL CODE     Dispo: weaning withdrawal regimen, PT/OT evaluation ongoing     Brenton Derek   [facility] Medical Student MS4    Liam Gómez  [facility] Internal Medicine HO-4    [facility] Medicine Hospitalist Pager numbers:   [facility] Hospitalist Medicine Team A (Andi/Khalida): [phone]  [facility] Hospitalist Medicine Team B (Jens/Barbie):  [phone]

## 2020-08-06 NOTE — PLAN OF CARE
1915H- Received patient upon rounds last night, patient seen in bed on side lying position. Asleep, easy to be awaken with voice. Patient calm and cooperative. Not coherent to time and date. Patient talks incoherently upon rounds. CIWA-8, no headache, no palpitations, no dizziness. Mild tremors, seems mildly anxious whenever awake.   Verbalized that he wants to get out of bed, but easily redirected. With saline lock right hand  and left hand gauge 20, flushed patent, with clean and dry dressing. Telemetry Normal Sinus rhythm, 100's. Uses urinal, draining clear yellow urine. Oxygen saturation 99% on room air.  Advised patient to call for any assistance. Call bell within reach. Bed alarm on. Safety fall precaution maintained. Will continue to monitor patient.    0630H- Patient stable VS over the night, afebrile. No untoward signs and symptoms noted over the night. Telemetry no ectopy. Free from fall. IV line patent.Will endorse patient to day shift Nurse.

## 2020-08-06 NOTE — PLAN OF CARE
Patient had a SpO2 of 99 on 1L of O2, so removed NC, monitored patient and sats remained the same.  Will continue to monitor.  O2 at bedside if needed.

## 2020-08-06 NOTE — NURSING
Plan of care reviewed with patient, verbalized understanding.  No complaints or acute distress noted. Instructed to call for assistance with toileting, understanding verbalized.  Bed alarm on, call light in reach, fall precautions in place. Will continue to monitor.

## 2020-08-06 NOTE — PT/OT/SLP PROGRESS
Physical Therapy Treatment    Patient Name:  Abe Cook   MRN:  [id number]    Recommendations:     Discharge Recommendations:  other (see comments)(ongoing assessment)   Discharge Equipment Recommendations: other (see comments)(TBD)   Barriers to discharge: Decreased caregiver support    Assessment:     Abe Cook is a 50 y.o. male admitted with a medical diagnosis of Alcohol withdrawal delirium, acute, hyperactive.  He presents with the following impairments/functional limitations:  weakness, impaired endurance, impaired balance, impaired self care skills, impaired functional mobilty, gait instability, impaired cognition, decreased coordination, decreased upper extremity function, decreased lower extremity function, decreased safety awareness, pain, impaired coordination. Pt presented with mild tremors this date, improved from yesterday's session but still limiting pt's self care and mobility. Pt ambulated 20 ft with RW and minimal assist, but required max verbal cues for RW management. Pt able to perform bed transfers with SBA, but requires verbal cues for hand and foot placement. Pt requires further assessment to make d/c recommendation.    Rehab Prognosis: Good; patient would benefit from acute skilled PT services to address these deficits and reach maximum level of function.    Recent Surgery: * No surgery found *      Plan:     During this hospitalization, patient to be seen 5 x/week to address the identified rehab impairments via gait training, therapeutic activities, therapeutic exercises, neuromuscular re-education and progress toward the following goals:    · Plan of Care Expires:  09/05/20    Subjective     Chief Complaint: Pt c/o pain in legs  Patient/Family Comments/goals: Pt reports pain in legs following ambulating to commode this date prior to PT visit.  Pain/Comfort:  · Pain Rating 1: 5/10  · Location - Side 1: Bilateral  · Location - Orientation 1: generalized  · Location 1: leg- primarily  calves and knees  · Pain Addressed 1: Reposition, Distraction, Cessation of Activity, Nurse notified  · Pain Rating Post-Intervention 1: 7/10      Objective:     Communicated with nurse Shelley prior to session.  Patient found HOB elevated with bed alarm, telemetry upon PT entry to room.     General Precautions: Standard, fall, seizure   Orthopedic Precautions:N/A   Braces: N/A     Functional Mobility:  · Bed Mobility:     · Scooting: stand by assistance  · Supine to Sit: minimum assistance  · Sit to Supine: stand by assistance  · Transfers:     · Sit to Stand:  minimum assistance with rolling walker  · Gait: Pt ambulated 20 ft with RW and minimal assist, requiring max cues for RW management- pt with flexed posture, downward gaze, and impaired coordination during ambulation      AM-PAC 6 CLICK MOBILITY  Turning over in bed (including adjusting bedclothes, sheets and blankets)?: 3  Sitting down on and standing up from a chair with arms (e.g., wheelchair, bedside commode, etc.): 3  Moving from lying on back to sitting on the side of the bed?: 3  Moving to and from a bed to a chair (including a wheelchair)?: 3  Need to walk in hospital room?: 3  Climbing 3-5 steps with a railing?: 1  Basic Mobility Total Score: 16       Therapeutic Activities and Exercises:  Pt performed seated EOB exercises: APs, LAQs  Pt transferred sit<>stand min assist and ambulated 20 ft min assist with max verbal cues for RW management.  Pt returned to bed, performing sit<>supine and scooting SBA, but required max verbal cues for hand/foot placement.  Pt quickly withdrawing limbs with light palpation or when reaching towards pt.    Patient left HOB elevated with all lines intact, call button in reach, bed alarm on and nurse notified..    GOALS:   Multidisciplinary Problems     Physical Therapy Goals        Problem: Physical Therapy Goal    Goal Priority Disciplines Outcome Goal Variances Interventions   Physical Therapy Goal     PT, PT/OT  Ongoing, Progressing     Description: Goals to be met by: 20     Patient will increase functional independence with mobility by performin. Supine <> sit with Modified [place]  2. Sit to stand transfer with Modified [place]  3. Bed to chair transfer with Modified [place] using appropriate AD  4. Gait  x 150 feet with mod I using appropriate AD  5. Lower extremity exercise program x 10 reps per handout, with supervision                     Time Tracking:     PT Received On: 20  PT Start Time: 1033     PT Stop Time: 1057  PT Total Time (min): 24 min     Billable Minutes: Gait Training 14 and Therapeutic Activity 10    Treatment Type: Treatment  PT/PTA: PT     PTA Visit Number: 0     Yesy Jung, PT  2020

## 2020-08-07 PROBLEM — E83.39 HYPOPHOSPHATEMIA: Status: ACTIVE | Noted: 2020-08-07

## 2020-08-07 LAB
ALBUMIN SERPL BCP-MCNC: 2.8 G/DL (ref 3.5–5.2)
ALP SERPL-CCNC: 144 U/L (ref 55–135)
ALT SERPL W/O P-5'-P-CCNC: 41 U/L (ref 10–44)
ANION GAP SERPL CALC-SCNC: 8 MMOL/L (ref 8–16)
AST SERPL-CCNC: 97 U/L (ref 10–40)
BASOPHILS # BLD AUTO: 0.03 K/UL (ref 0–0.2)
BASOPHILS NFR BLD: 0.8 % (ref 0–1.9)
BILIRUB SERPL-MCNC: 1 MG/DL (ref 0.1–1)
BUN SERPL-MCNC: 11 MG/DL (ref 6–20)
CALCIUM SERPL-MCNC: 8.7 MG/DL (ref 8.7–10.5)
CHLORIDE SERPL-SCNC: 112 MMOL/L (ref 95–110)
CO2 SERPL-SCNC: 22 MMOL/L (ref 23–29)
CREAT SERPL-MCNC: 1.2 MG/DL (ref 0.5–1.4)
DIFFERENTIAL METHOD: ABNORMAL
EOSINOPHIL # BLD AUTO: 0.1 K/UL (ref 0–0.5)
EOSINOPHIL NFR BLD: 2.7 % (ref 0–8)
ERYTHROCYTE [DISTWIDTH] IN BLOOD BY AUTOMATED COUNT: 12.1 % (ref 11.5–14.5)
EST. GFR  (AFRICAN AMERICAN): >60 ML/MIN/1.73 M^2
EST. GFR  (NON AFRICAN AMERICAN): >60 ML/MIN/1.73 M^2
GLUCOSE SERPL-MCNC: 130 MG/DL (ref 70–110)
HCT VFR BLD AUTO: 32.7 % (ref 40–54)
HGB BLD-MCNC: 10.8 G/DL (ref 14–18)
IMM GRANULOCYTES # BLD AUTO: 0.01 K/UL (ref 0–0.04)
IMM GRANULOCYTES NFR BLD AUTO: 0.3 % (ref 0–0.5)
LYMPHOCYTES # BLD AUTO: 0.9 K/UL (ref 1–4.8)
LYMPHOCYTES NFR BLD: 23.3 % (ref 18–48)
MAGNESIUM SERPL-MCNC: 1.9 MG/DL (ref 1.6–2.6)
MCH RBC QN AUTO: 34.8 PG (ref 27–31)
MCHC RBC AUTO-ENTMCNC: 33 G/DL (ref 32–36)
MCV RBC AUTO: 106 FL (ref 82–98)
MONOCYTES # BLD AUTO: 0.5 K/UL (ref 0.3–1)
MONOCYTES NFR BLD: 14.5 % (ref 4–15)
NEUTROPHILS # BLD AUTO: 2.1 K/UL (ref 1.8–7.7)
NEUTROPHILS NFR BLD: 58.4 % (ref 38–73)
NRBC BLD-RTO: 0 /100 WBC
PHOSPHATE SERPL-MCNC: 2.3 MG/DL (ref 2.7–4.5)
PLATELET # BLD AUTO: 117 K/UL (ref 150–350)
PMV BLD AUTO: 10.9 FL (ref 9.2–12.9)
POCT GLUCOSE: 122 MG/DL (ref 70–110)
POCT GLUCOSE: 133 MG/DL (ref 70–110)
POCT GLUCOSE: 145 MG/DL (ref 70–110)
POCT GLUCOSE: 232 MG/DL (ref 70–110)
POTASSIUM SERPL-SCNC: 3.4 MMOL/L (ref 3.5–5.1)
PROT SERPL-MCNC: 6.9 G/DL (ref 6–8.4)
RBC # BLD AUTO: 3.1 M/UL (ref 4.6–6.2)
SODIUM SERPL-SCNC: 142 MMOL/L (ref 136–145)
WBC # BLD AUTO: 3.65 K/UL (ref 3.9–12.7)

## 2020-08-07 PROCEDURE — 25000003 PHARM REV CODE 250: Performed by: STUDENT IN AN ORGANIZED HEALTH CARE EDUCATION/TRAINING PROGRAM

## 2020-08-07 PROCEDURE — 80053 COMPREHEN METABOLIC PANEL: CPT

## 2020-08-07 PROCEDURE — 11000001 HC ACUTE MED/SURG PRIVATE ROOM

## 2020-08-07 PROCEDURE — 97116 GAIT TRAINING THERAPY: CPT

## 2020-08-07 PROCEDURE — 83735 ASSAY OF MAGNESIUM: CPT

## 2020-08-07 PROCEDURE — 36415 COLL VENOUS BLD VENIPUNCTURE: CPT

## 2020-08-07 PROCEDURE — 84100 ASSAY OF PHOSPHORUS: CPT

## 2020-08-07 PROCEDURE — 85025 COMPLETE CBC W/AUTO DIFF WBC: CPT

## 2020-08-07 PROCEDURE — 63600175 PHARM REV CODE 636 W HCPCS: Performed by: STUDENT IN AN ORGANIZED HEALTH CARE EDUCATION/TRAINING PROGRAM

## 2020-08-07 PROCEDURE — 94761 N-INVAS EAR/PLS OXIMETRY MLT: CPT

## 2020-08-07 RX ORDER — POTASSIUM CHLORIDE 20 MEQ/1
20 TABLET, EXTENDED RELEASE ORAL ONCE
Status: COMPLETED | OUTPATIENT
Start: 2020-08-07 | End: 2020-08-07

## 2020-08-07 RX ORDER — ACETAMINOPHEN 325 MG/1
325 TABLET ORAL EVERY 6 HOURS PRN
Status: DISCONTINUED | OUTPATIENT
Start: 2020-08-07 | End: 2020-08-08 | Stop reason: HOSPADM

## 2020-08-07 RX ADMIN — DIAZEPAM 10 MG: 5 TABLET ORAL at 06:08

## 2020-08-07 RX ADMIN — DIAZEPAM 10 MG: 5 TABLET ORAL at 02:08

## 2020-08-07 RX ADMIN — FOLIC ACID 1 MG: 1 TABLET ORAL at 10:08

## 2020-08-07 RX ADMIN — ACETAMINOPHEN 325 MG: 325 TABLET ORAL at 05:08

## 2020-08-07 RX ADMIN — Medication 100 MG: at 10:08

## 2020-08-07 RX ADMIN — THERA TABS 1 TABLET: TAB at 10:08

## 2020-08-07 RX ADMIN — ENOXAPARIN SODIUM 40 MG: 100 INJECTION SUBCUTANEOUS at 05:08

## 2020-08-07 RX ADMIN — LISINOPRIL 10 MG: 5 TABLET ORAL at 10:08

## 2020-08-07 RX ADMIN — GABAPENTIN 100 MG: 100 CAPSULE ORAL at 09:08

## 2020-08-07 RX ADMIN — POTASSIUM CHLORIDE 20 MEQ: 1500 TABLET, EXTENDED RELEASE ORAL at 10:08

## 2020-08-07 RX ADMIN — GABAPENTIN 100 MG: 100 CAPSULE ORAL at 10:08

## 2020-08-07 RX ADMIN — GABAPENTIN 100 MG: 100 CAPSULE ORAL at 02:08

## 2020-08-07 RX ADMIN — DIAZEPAM 10 MG: 5 TABLET ORAL at 09:08

## 2020-08-07 NOTE — PLAN OF CARE
"1915H- Received patient upon rounds last night, patient seen in bed on side lying position. Conscious, not coherent to time and date. Patient calm and cooperative. CIWA-9, no headache, no palpitations, no dizziness, moderate tremors. With facial abrasion, left knee bruised with clean dressing. With saline lock right hand  and left hand gauge 20, flushed patent, with clean and dry dressing. Telemetry Normal Sinus rhythm, 100's. Uses urinal, draining clear yellow urine. Oxygen saturation 99% on room air.  Advised patient to call for any assistance. Call bell within reach. Bed alarm on. Safety fall precaution maintained. Will continue to monitor patient.     0200H- Verbalized that he wants to get out of bed, verbalized " I wanna do push up" "to make me stronger" advised to stay in bed , patient easily redirected.    0527H- Patient stable VS over the night, afebrile. No untoward signs and symptoms noted over the night. Telemetry no ectopy. Free from fall. IV line patent. Latest CIWA-4. Will endorse patient to day shift Nurse.   "

## 2020-08-07 NOTE — PLAN OF CARE
Patient alert and awake. C/o pain on left knee abrasion, Md informed put in order for tylenol PRN, medicine given as requested. CIWA scoring done every 4 hours.   No hallucinations, nausea and headache reported. Up with assist to commode. Bed alarm on. Call light within reach. Will continue to monitor patient.

## 2020-08-07 NOTE — MEDICAL/APP STUDENT
[employer] Medical Student Progress Note    Subjective:      Abe Cook is a 50 y.o.  male who is being followed by the [facility] Medicine service at [facility] for EtOH withdrawal.      This morning he is resting comfortably in bed. Less jittery when aroused this morning. Denies specific complaints of HA, CP, SOB. Required PRN ativan x2. Discussing inpatient vs outpatient EtOH rehab. Patient states he wants to go back to AA meetings, does not seem interested in inpatient.     Objective:   Last 24 Hour Vital Signs:  BP  Min: 118/81  Max: 148/103  Temp  Av.3 °F (36.8 °C)  Min: 97.6 °F (36.4 °C)  Max: 99.2 °F (37.3 °C)  Pulse  Av.3  Min: 85  Max: 116  Resp  Av  Min: 16  Max: 19  SpO2  Av.1 %  Min: 96 %  Max: 100 %  I/O last 3 completed shifts:  In: 1450 [P.O.:1400; I.V.:50]  Out: 2401 [Urine:2400; Stool:1]    Physical Examination:  GEN: alert, answers questions appropriately, no distress  HEENT: NC, superficial abrasion to right eye brow, no conjunctival injection, no rhinorhea  RESP: CTAB, no wheezes  CV: RRR, no murmurs, cap refill 3 seconds  ABD: BS present, soft, NT, ND   MSK: no deformities, no TTP  DERM: no rashes, multiple scabs over knuckles and palms  NEURO: A&O, no facial asymmetry, motor function grossly intact    Laboratory:  Laboratory Data Reviewed: yes  Pertinent Findings:  Trended Lab Data:  Recent Labs   Lab 20  0415 20  0339 20  0330   WBC 4.04 3.94 3.65*   HGB 11.0* 11.3* 10.8*   HCT 32.8* 34.5* 32.7*   PLT 81* 105* 117*   * 107* 106*   RDW 12.2 12.1 12.1    142 142   K 3.3* 3.8 3.4*    107 112*   CO2 24 27 22*   BUN 9 9 11   CREATININE 1.5* 1.4 1.2   * 130* 130*   PROT 6.9 6.9 6.9   ALBUMIN 2.9* 2.8* 2.8*   BILITOT 1.4* 1.4* 1.0   * 107* 97*   ALKPHOS 148* 148* 144*   ALT 45* 42 41       Trended Cardiac Data:  Recent Labs   Lab 20  1748   TROPONINI 0.012         Microbiology Data Reviewed: yes  Pertinent  "Findings:  none    Other Results:  EKG (my interpretation): none new.    Radiology Data Reviewed: yes  Pertinent Findings:  None new    Current Medications:     Infusions:       Scheduled:   diazePAM  10 mg Oral Q8H    enoxaparin  40 mg Subcutaneous Q24H    folic acid  1 mg Oral Daily    gabapentin  100 mg Oral TID    lisinopriL  10 mg Oral Daily    multivitamin  1 tablet Oral Daily    potassium chloride  20 mEq Oral Once    thiamine  100 mg Oral Daily        PRN:  lorazepam, sodium chloride 0.9%    Antibiotics and Day Number of Therapy:  none    Lines and Day Number of Therapy:  PIV 8/4-    Assessment:     Abe Cook is a 50 y.o. male with alcohol dependence, HTN, HLD, and T2DM who presents with EtOH wiithdrawal       Plan:     #Alcohol withdrawal  #Unintentional weight loss  #Fall  #Transaminitis  -Patient drinks daily for many months, recently increased during COVID quarantine, endorses approximately 20 lbs weight loss over this time  -Last drink was morning of admission  -tripped and fell day of admission with negative head/neck imaging, superficial scrapes treated in ED  -Patient counseled on the health benefits of alcohol cessation, currently action stage of change, states "I don't want to drink when I leave the hospital"   -Current regimen: Valium 10 mg q12hr, Gabapentin 100 TID, Ativan 1 mg IV q4hr PRN (required x2 yesterday)     #Macrocytic anemia  -likely related to malnutrition and EtOH abuse  -Around historical Hgb baseline at 11, MCV higher than in the past at 105  -Iron studies consistent with chronic inflammation, folate and B12 normal  -Thiamine, folate and MVI     #Thrombocytopenia  -likely related to EtOH abuse  -, trending up  -will plan to hold VTE PPX if <50     #AGMA, resolved  #Electrolyte derrangemnt  -Likely related to acute on chronic EtOH abuse and poor nutritional status   -Trend and replete PRN     #T2DM not on long-term insulin  -A1C 5.3 this admission  -Holding home " metformin  -SSI while inpatient     #HLD  -holding home statin     #HTN  -continue home lisinoprol     Diet: Diabetic  PPX: Lovenox  FULL CODE     Dispo: weaning withdrawal regimen, PT/OT evaluation ongoing     Brenton Derek  \[facility]\"" Medical Student - MS4  \[facility]\"" Hospitalist Medicine Service Team B    Man Gore MD  \[facility]\"" Medicine-Pediatrics HO-2  Cell [phone]  08/07/2020 8:42 AM    \[facility]\"" Medicine Hospitalist Pager numbers:   \[facility]\"" Hospitalist Medicine Team A (Andi/Khalida): [phone]  \[facility]\"" Hospitalist Medicine Team B (Jens/Barbie):  [phone]

## 2020-08-07 NOTE — PT/OT/SLP PROGRESS
Physical Therapy Treatment    Patient Name:  Abe Cook   MRN:  [id number]    Recommendations:     Discharge Recommendations:  (defer to MD regarding IP alcohol rehab facility vs OP)   Discharge Equipment Recommendations: walker, rolling, shower chair   Barriers to discharge: Decreased caregiver support    Assessment:     Abe Cook is a 50 y.o. male admitted with a medical diagnosis of Alcohol withdrawal delirium, acute, hyperactive.  He presents with the following impairments/functional limitations:  weakness, impaired endurance, impaired self care skills, impaired functional mobilty, gait instability, impaired balance, impaired cognition, decreased coordination, decreased upper extremity function, decreased lower extremity function, decreased safety awareness, pain, impaired coordination. Pt bed mobility supine to sit SBA with increased time. Pt sit to stand required 3 attempts with mod cues for hand placement on RW. Pt ambulated 90 ft with RW and CGA, presenting with UE tremors and strong  on RW. Per MD assessment, pt is appropriate for rehabilitation facility and/or outpatient rehab in conjunction with alcohol rehabilitation.     Rehab Prognosis: Good; patient would benefit from acute skilled PT services to address these deficits and reach maximum level of function.    Recent Surgery: * No surgery found *      Plan:     During this hospitalization, patient to be seen 5 x/week to address the identified rehab impairments via gait training, therapeutic activities, therapeutic exercises, neuromuscular re-education and progress toward the following goals:    · Plan of Care Expires:  09/05/20    Subjective     Chief Complaint: Pain in lower legs  Patient/Family Comments/goals: Pt reports pain in legs after ambulating to commode earlier this date.  Pain/Comfort:  Pain Rating 1: (not rated but reported pain in BLE from knees down)  Location - Side 1: Bilateral  Location - Orientation 1: lower  Location 1:  leg  Pain Addressed 1: Reposition, Distraction, Cessation of Activity, Nurse notified  Pain Rating Post-Intervention 1: (not rated but reported pain in BLE below knees)      Objective:     Communicated with nurse prior to session.  Patient found HOB elevated with bed alarm, telemetry upon PT entry to room.     General Precautions: Standard, fall, seizure   Orthopedic Precautions:N/A   Braces: N/A     Functional Mobility:  · Bed Mobility:     · Scooting: stand by assistance  · Supine to Sit: stand by assistance and increased time  · Sit to Supine: stand by assistance  · Transfers:     · Sit to Stand:  contact guard assistance with rolling walker- cues for hand placement  · Gait: Pt ambulated 90 ft with RW and CGA. Pt presented with UE tremors and strong  on RW. Mild unsteadiness with gait, moderate UE tremors.      AM-PAC 6 CLICK MOBILITY  Turning over in bed (including adjusting bedclothes, sheets and blankets)?: 3  Sitting down on and standing up from a chair with arms (e.g., wheelchair, bedside commode, etc.): 3  Moving from lying on back to sitting on the side of the bed?: 3  Moving to and from a bed to a chair (including a wheelchair)?: 3  Need to walk in hospital room?: 3  Climbing 3-5 steps with a railing?: 1  Basic Mobility Total Score: 16       Therapeutic Activities and Exercises:  Pt transferred supine<>sit with SBA and increased time  Pt transferred sit<>stand with CGA, requiring 3 attempts to stand successfully and mod cues for hand placement on RW.  Pt ambulated 90 ft with RW and CGA with UE tremors and strong  on RW.  Pt stood at sink for oral hygiene with CGA.  Pt presented with mod UE tremors this date.  Returned to supine per pt request.    Patient left HOB elevated with all lines intact, call button in reach, bed alarm on and nurse notified..    GOALS:   Multidisciplinary Problems     Physical Therapy Goals        Problem: Physical Therapy Goal    Goal Priority Disciplines Outcome Goal  Variances Interventions   Physical Therapy Goal     PT, PT/OT Ongoing, Progressing     Description: Goals to be met by: 20     Patient will increase functional independence with mobility by performin. Supine <> sit with Modified [place]  2. Sit to stand transfer with Modified [place]  3. Bed to chair transfer with Modified [place] using appropriate AD  4. Gait  x 150 feet with mod I using appropriate AD  5. Lower extremity exercise program x 10 reps per handout, with supervision                     Time Tracking:     PT Received On: 20  PT Start Time: 1037     PT Stop Time: 1055  PT Total Time (min): 18 min     Billable Minutes: Gait Training 10    Treatment Type: Treatment  PT/PTA: PT     PTA Visit Number: 0     Yesy Jung, PT  2020

## 2020-08-07 NOTE — PT/OT/SLP PROGRESS
Occupational Therapy  Missed Visit    Patient Name:  Abe Cook   MRN:  [id number]    Patient not seen today secondary to Patient unwilling to participate. Will follow-up as able.    ROSE MARY Begum  8/7/2020

## 2020-08-07 NOTE — PLAN OF CARE
Problem: Physical Therapy Goal  Goal: Physical Therapy Goal  Description: Goals to be met by: 20     Patient will increase functional independence with mobility by performin. Supine <> sit with Modified [place]  2. Sit to stand transfer with Modified [place]  3. Bed to chair transfer with Modified [place] using appropriate AD  4. Gait  x 150 feet with mod I using appropriate AD  5. Lower extremity exercise program x 10 reps per handout, with supervision    Outcome: Ongoing, Progressing     Pt making progress with mobility as he was able to ambulate 90 ft with RW but continues to present with BUE tremors and instability with ambulation. Pt would benefit from continued PT services upon d/c. Deferring to MD regarding pt's need for IP alcohol rehab vs OP.

## 2020-08-08 VITALS
HEART RATE: 81 BPM | WEIGHT: 192.88 LBS | TEMPERATURE: 99 F | BODY MASS INDEX: 28.57 KG/M2 | DIASTOLIC BLOOD PRESSURE: 96 MMHG | RESPIRATION RATE: 18 BRPM | HEIGHT: 69 IN | SYSTOLIC BLOOD PRESSURE: 137 MMHG | OXYGEN SATURATION: 99 %

## 2020-08-08 LAB
ALBUMIN SERPL BCP-MCNC: 2.8 G/DL (ref 3.5–5.2)
ALP SERPL-CCNC: 137 U/L (ref 55–135)
ALT SERPL W/O P-5'-P-CCNC: 44 U/L (ref 10–44)
ANION GAP SERPL CALC-SCNC: 8 MMOL/L (ref 8–16)
AST SERPL-CCNC: 89 U/L (ref 10–40)
BASOPHILS # BLD AUTO: 0.03 K/UL (ref 0–0.2)
BASOPHILS NFR BLD: 0.7 % (ref 0–1.9)
BILIRUB SERPL-MCNC: 0.8 MG/DL (ref 0.1–1)
BUN SERPL-MCNC: 10 MG/DL (ref 6–20)
CALCIUM SERPL-MCNC: 8.7 MG/DL (ref 8.7–10.5)
CHLORIDE SERPL-SCNC: 111 MMOL/L (ref 95–110)
CO2 SERPL-SCNC: 23 MMOL/L (ref 23–29)
CREAT SERPL-MCNC: 1.1 MG/DL (ref 0.5–1.4)
DIFFERENTIAL METHOD: ABNORMAL
EOSINOPHIL # BLD AUTO: 0.1 K/UL (ref 0–0.5)
EOSINOPHIL NFR BLD: 2.7 % (ref 0–8)
ERYTHROCYTE [DISTWIDTH] IN BLOOD BY AUTOMATED COUNT: 12.1 % (ref 11.5–14.5)
EST. GFR  (AFRICAN AMERICAN): >60 ML/MIN/1.73 M^2
EST. GFR  (NON AFRICAN AMERICAN): >60 ML/MIN/1.73 M^2
GLUCOSE SERPL-MCNC: 125 MG/DL (ref 70–110)
HCT VFR BLD AUTO: 32.9 % (ref 40–54)
HGB BLD-MCNC: 10.9 G/DL (ref 14–18)
IMM GRANULOCYTES # BLD AUTO: 0.01 K/UL (ref 0–0.04)
IMM GRANULOCYTES NFR BLD AUTO: 0.2 % (ref 0–0.5)
LYMPHOCYTES # BLD AUTO: 1.3 K/UL (ref 1–4.8)
LYMPHOCYTES NFR BLD: 28.3 % (ref 18–48)
MAGNESIUM SERPL-MCNC: 1.7 MG/DL (ref 1.6–2.6)
MCH RBC QN AUTO: 34.8 PG (ref 27–31)
MCHC RBC AUTO-ENTMCNC: 33.1 G/DL (ref 32–36)
MCV RBC AUTO: 105 FL (ref 82–98)
MONOCYTES # BLD AUTO: 0.6 K/UL (ref 0.3–1)
MONOCYTES NFR BLD: 13.6 % (ref 4–15)
NEUTROPHILS # BLD AUTO: 2.4 K/UL (ref 1.8–7.7)
NEUTROPHILS NFR BLD: 54.5 % (ref 38–73)
NRBC BLD-RTO: 0 /100 WBC
PHOSPHATE SERPL-MCNC: 3.1 MG/DL (ref 2.7–4.5)
PLATELET # BLD AUTO: 136 K/UL (ref 150–350)
PMV BLD AUTO: 10.7 FL (ref 9.2–12.9)
POCT GLUCOSE: 119 MG/DL (ref 70–110)
POCT GLUCOSE: 141 MG/DL (ref 70–110)
POTASSIUM SERPL-SCNC: 3.4 MMOL/L (ref 3.5–5.1)
PROT SERPL-MCNC: 6.7 G/DL (ref 6–8.4)
RBC # BLD AUTO: 3.13 M/UL (ref 4.6–6.2)
SODIUM SERPL-SCNC: 142 MMOL/L (ref 136–145)
WBC # BLD AUTO: 4.41 K/UL (ref 3.9–12.7)

## 2020-08-08 PROCEDURE — 36415 COLL VENOUS BLD VENIPUNCTURE: CPT

## 2020-08-08 PROCEDURE — 25000003 PHARM REV CODE 250: Performed by: STUDENT IN AN ORGANIZED HEALTH CARE EDUCATION/TRAINING PROGRAM

## 2020-08-08 PROCEDURE — 80053 COMPREHEN METABOLIC PANEL: CPT

## 2020-08-08 PROCEDURE — 85025 COMPLETE CBC W/AUTO DIFF WBC: CPT

## 2020-08-08 PROCEDURE — 83735 ASSAY OF MAGNESIUM: CPT

## 2020-08-08 PROCEDURE — 94761 N-INVAS EAR/PLS OXIMETRY MLT: CPT

## 2020-08-08 PROCEDURE — 84100 ASSAY OF PHOSPHORUS: CPT

## 2020-08-08 RX ORDER — DIAZEPAM 5 MG/1
5 TABLET ORAL SEE ADMIN INSTRUCTIONS
Qty: 7 TABLET | Refills: 0 | Status: SHIPPED | OUTPATIENT
Start: 2020-08-08 | End: 2020-09-23

## 2020-08-08 RX ORDER — POTASSIUM CHLORIDE 20 MEQ/1
40 TABLET, EXTENDED RELEASE ORAL ONCE
Status: COMPLETED | OUTPATIENT
Start: 2020-08-08 | End: 2020-08-08

## 2020-08-08 RX ORDER — LANOLIN ALCOHOL/MO/W.PET/CERES
100 CREAM (GRAM) TOPICAL DAILY
COMMUNITY
Start: 2020-08-08 | End: 2020-09-23

## 2020-08-08 RX ADMIN — ACETAMINOPHEN 325 MG: 325 TABLET ORAL at 01:08

## 2020-08-08 RX ADMIN — FOLIC ACID 1 MG: 1 TABLET ORAL at 09:08

## 2020-08-08 RX ADMIN — LISINOPRIL 10 MG: 5 TABLET ORAL at 09:08

## 2020-08-08 RX ADMIN — ACETAMINOPHEN 325 MG: 325 TABLET ORAL at 06:08

## 2020-08-08 RX ADMIN — THERA TABS 1 TABLET: TAB at 09:08

## 2020-08-08 RX ADMIN — POTASSIUM CHLORIDE 40 MEQ: 1500 TABLET, EXTENDED RELEASE ORAL at 09:08

## 2020-08-08 RX ADMIN — Medication 100 MG: at 09:08

## 2020-08-08 RX ADMIN — DIAZEPAM 10 MG: 5 TABLET ORAL at 06:08

## 2020-08-08 RX ADMIN — GABAPENTIN 100 MG: 100 CAPSULE ORAL at 09:08

## 2020-08-08 NOTE — MEDICAL/APP STUDENT
[employer] Medical Student Progress Note    Subjective:      Abe Cook is a 50 y.o.  male who is being followed by the [facility] Medicine service at [facility] for EtOH withdrawal.      This morning he is resting comfortably in bed. Less jittery when aroused this morning. Complains of trouble walking unassisted. Denies specific complaints of HA, CP, SOB. Did not require PRN Ativan last night.  Patient states he wants to go back to AA meetings, is not interested in inpatient.     Objective:   Last 24 Hour Vital Signs:  BP  Min: 121/86  Max: 143/95  Temp  Av.8 °F (37.1 °C)  Min: 98.7 °F (37.1 °C)  Max: 99.1 °F (37.3 °C)  Pulse  Av.3  Min: 84  Max: 102  Resp  Av  Min: 17  Max: 19  SpO2  Av.5 %  Min: 96 %  Max: 99 %  I/O last 3 completed shifts:  In: 500 [P.O.:500]  Out: 900 [Urine:900]    Physical Examination:  GEN: alert, answers questions appropriately, no distress  HEENT: NC, superficial abrasion to right eye brow, no conjunctival injection, no rhinorhea  RESP: CTAB, no wheezes  CV: RRR, no murmurs, cap refill 3 seconds  ABD: BS present, soft, NT, ND   MSK: no deformities, no TTP  DERM: no rashes, multiple scabs over knuckles and palms  NEURO: A&O, no facial asymmetry, motor function grossly intact    Laboratory:  Laboratory Data Reviewed: yes  Pertinent Findings:  Trended Lab Data:  Recent Labs   Lab 20  0339 20  0330 20  0322   WBC 3.94 3.65* 4.41   HGB 11.3* 10.8* 10.9*   HCT 34.5* 32.7* 32.9*   * 117* 136*   * 106* 105*   RDW 12.1 12.1 12.1    142 142   K 3.8 3.4* 3.4*    112* 111*   CO2 27 22* 23   BUN 9 11 10   CREATININE 1.4 1.2 1.1   * 130* 125*   PROT 6.9 6.9 6.7   ALBUMIN 2.8* 2.8* 2.8*   BILITOT 1.4* 1.0 0.8   * 97* 89*   ALKPHOS 148* 144* 137*   ALT 42 41 44       Trended Cardiac Data:  Recent Labs   Lab 20  1748   TROPONINI 0.012         Microbiology Data Reviewed: yes  Pertinent Findings:  none    Other  "Results:  EKG (my interpretation): none new.    Radiology Data Reviewed: yes  Pertinent Findings:  None new    Current Medications:     Infusions:       Scheduled:   diazePAM  10 mg Oral Q8H    enoxaparin  40 mg Subcutaneous Q24H    folic acid  1 mg Oral Daily    gabapentin  100 mg Oral TID    lisinopriL  10 mg Oral Daily    multivitamin  1 tablet Oral Daily    potassium chloride  40 mEq Oral Once    thiamine  100 mg Oral Daily        PRN:  acetaminophen, lorazepam, sodium chloride 0.9%    Antibiotics and Day Number of Therapy:  none    Lines and Day Number of Therapy:  PIV 8/4-    Assessment:     Abe Cook is a 50 y.o. male with alcohol dependence, HTN, HLD, and T2DM who presents with EtOH wiithdrawal       Plan:     #Alcohol withdrawal  #Unintentional weight loss  #Fall  #Transaminitis  -Patient drinks daily for many months, recently increased during COVID quarantine, endorses approximately 20 lbs weight loss over this time  -Last drink was morning of admission  -tripped and fell day of admission with negative head/neck imaging, superficial scrapes treated in ED  -Patient counseled on the health benefits of alcohol cessation, currently action stage of change, states "I don't want to drink when I leave the hospital"   -Current regimen: Valium 10 mg q12hr, Gabapentin 100 TID, Ativan 1 mg IV q4hr PRN. Plan to discontinue on discharge  -Plan for outpatient Valium taper: 5 mg TID for one day, 5 mg BID for two days     #Macrocytic anemia  -likely related to malnutrition and EtOH abuse  -Around historical Hgb baseline at 11, MCV higher than in the past at 105  -Iron studies consistent with chronic inflammation, folate and B12 normal  -Thiamine, folate and MVI     #Thrombocytopenia  -likely related to EtOH abuse  -, trending up  -will plan to hold VTE PPX if <50     #AGMA, resolved  #Electrolyte derrangemnt  -Likely related to acute on chronic EtOH abuse and poor nutritional status   -Trend and replete " PRN     #T2DM not on long-term insulin  -A1C 5.3 this admission  -Holding home metformin  -SSI while inpatient     #HLD  -holding home statin     #HTN  -continue home lisinoprol     Diet: Diabetic  PPX: Lovenox  FULL CODE     Dispo: weaning withdrawal regimen, PT/OT evaluation ongoing     Brenton Derek  [facility] Medical Student - MS4  [facility] Hospitalist Medicine Service Team B      [facility] Medicine Hospitalist Pager numbers:   [facility] Hospitalist Medicine Team A (Andi/Khalida): [phone]  [facility] Hospitalist Medicine Team B (Jens/Barbie):  [phone]

## 2020-08-08 NOTE — PLAN OF CARE
9:10 am,  TN contacted by [facility], Anjali Patricio. OK to pull RW for pt from [facility]. Patient refused ordered Shower Chair.     10:15 am, TN delivered RW to pt bedside. TN verified withpt that he refused Shower Chair, information provided in AVS on where one can be obtained out of pocket, should pt decide he would like one. Signed paperwork delivered to [facility].    Pt's mother was bedside and will provide transportation home at discharge.    Pt's nurse will go over medications/signs and symptoms prior to discharge       08/08/20 1145   Final Note   Assessment Type Final Discharge Note   Anticipated Discharge Disposition Home   What phone number can be called within the next 1-3 days to see how you are doing after discharge? [phone]   Hospital Follow Up  Appt(s) scheduled? No  (Offices closed for weekend. Patient to schedule own follow up appointment.)   Right [facility] Referral Info   Post Acute Recommendation No Care     Alba Anderson, RN Transitional Navigator  [phone]

## 2020-08-08 NOTE — PROGRESS NOTES
[employer] Medical Resident Progress Note    Subjective:      Abe Cook is a 50 y.o.  male who is being followed by the [facility] service at [facility] for EtOH withdrawal.      This morning he is resting comfortably in bed. Less jittery when aroused this morning. Complains of trouble walking unassisted. Denies specific complaints of HA, CP, SOB. Did not require PRN Ativan last night.  Patient states he wants to go back to AA meetings, is not interested in inpatient.     Objective:   Last 24 Hour Vital Signs:  BP  Min: 121/86  Max: 143/95  Temp  Av.8 °F (37.1 °C)  Min: 98.7 °F (37.1 °C)  Max: 99.1 °F (37.3 °C)  Pulse  Av.2  Min: 81  Max: 99  Resp  Av  Min: 17  Max: 19  SpO2  Av.7 %  Min: 96 %  Max: 99 %  I/O last 3 completed shifts:  In: 500 [P.O.:500]  Out: 900 [Urine:900]    Physical Examination:  GEN: alert, answers questions appropriately, no distress  HEENT: NC, superficial abrasion to right eye brow, no conjunctival injection, no rhinorhea  RESP: CTAB, no wheezes  CV: RRR, no murmurs, cap refill 3 seconds  ABD: BS present, soft, NT, ND   MSK: no deformities, no TTP  DERM: no rashes, multiple scabs over knuckles and palms  NEURO: A&O, no facial asymmetry, motor function grossly intact    Laboratory:  Laboratory Data Reviewed: yes  Pertinent Findings:  Trended Lab Data:  Recent Labs   Lab 20  0339 20  0330 20  0322   WBC 3.94 3.65* 4.41   HGB 11.3* 10.8* 10.9*   HCT 34.5* 32.7* 32.9*   * 117* 136*   * 106* 105*   RDW 12.1 12.1 12.1    142 142   K 3.8 3.4* 3.4*    112* 111*   CO2 27 22* 23   BUN 9 11 10   CREATININE 1.4 1.2 1.1   * 130* 125*   PROT 6.9 6.9 6.7   ALBUMIN 2.8* 2.8* 2.8*   BILITOT 1.4* 1.0 0.8   * 97* 89*   ALKPHOS 148* 144* 137*   ALT 42 41 44       Trended Cardiac Data:  Recent Labs   Lab 20  1748   TROPONINI 0.012         Microbiology Data Reviewed: yes  Pertinent Findings:  none    Other  "Results:  EKG (my interpretation): none new.    Radiology Data Reviewed: yes  Pertinent Findings:  None new    Current Medications:     Infusions:       Scheduled:   diazePAM  10 mg Oral Q8H    enoxaparin  40 mg Subcutaneous Q24H    folic acid  1 mg Oral Daily    gabapentin  100 mg Oral TID    lisinopriL  10 mg Oral Daily    multivitamin  1 tablet Oral Daily    thiamine  100 mg Oral Daily        PRN:  acetaminophen, lorazepam, sodium chloride 0.9%    Antibiotics and Day Number of Therapy:  none    Lines and Day Number of Therapy:  PIV 8/4-    Assessment:     Abe Cook is a 50 y.o. male with alcohol dependence, HTN, HLD, and T2DM who presents with EtOH wiithdrawal       Plan:     #Alcohol withdrawal  #Unintentional weight loss  #Fall  #Transaminitis  -Patient drinks daily for many months, recently increased during COVID quarantine, endorses approximately 20 lbs weight loss over this time  -Last drink was morning of admission  -tripped and fell day of admission with negative head/neck imaging, superficial scrapes treated in ED  -Patient counseled on the health benefits of alcohol cessation, currently action stage of change, states "I don't want to drink when I leave the hospital"   -Current regimen: Valium 10 mg q12hr, Gabapentin 100 TID, Ativan 1 mg IV q4hr PRN. Plan to discontinue on discharge  -Plan for outpatient Valium taper: 5 mg TID for one day, 5 mg BID for two days     #Macrocytic anemia  -likely related to malnutrition and EtOH abuse  -Around historical Hgb baseline at 11, MCV higher than in the past at 105  -Iron studies consistent with chronic inflammation, folate and B12 normal  -Thiamine, folate and MVI     #Thrombocytopenia  -likely related to EtOH abuse  -, trending up  -will plan to hold VTE PPX if <50     #AGMA, resolved  #Electrolyte derrangemnt  -Likely related to acute on chronic EtOH abuse and poor nutritional status   -Trend and replete PRN     #T2DM not on long-term " insulin  -A1C 5.3 this admission  -Holding home metformin  -SSI while inpatient     #HLD  -holding home statin     #HTN  -continue home lisinoprol     Diet: Diabetic  PPX: Lovenox  FULL CODE     Dispo: weaning withdrawal regimen, PT/OT evaluation ongoing     Brenton Derek  [facility] Medical Student - MS4  [facility] Hospitalist Medicine Service Team B    Liam Gómez MD  [facility] Medicine B      [facility] Medicine Hospitalist Pager numbers:   [facility] Hospitalist Medicine Team A (Andi/Khaldia): [phone]  [facility] Hospitalist Medicine Team B (Jens/Barbie):  [phone]

## 2020-08-08 NOTE — DISCHARGE SUMMARY
[employer] Medicine Discharge Summary    Primary Team: [employer] Medicine Team B  Attending Physician: Ousmane Colindres MD  Resident: Rico  Intern: Bao    Date of Admit: 8/3/2020  Date of Discharge: 8/8/2020    Discharge to: Home  Condition: Fair    Discharge Diagnoses     Patient Active Problem List   Diagnosis    Bowel dysfunction    Essential (primary) hypertension    Hyperlipidemia    Insomnia    Leg cramps    Rotator cuff syndrome of right shoulder    Syncope    Type 2 diabetes mellitus with hyperglycemia    Type 2 diabetes mellitus without complications    Alcohol withdrawal delirium, acute, hyperactive    Alcohol withdrawal syndrome with complication    Transaminitis    Macrocytic anemia    Hypokalemia    Hypomagnesemia    High anion gap metabolic acidosis    Unintentional weight loss    Hypophosphatemia       Consultants and Procedures     Consultants:  None    Procedures:   None    Brief History of Present Illness      Abe Cook is a 50 y.o. male who  has a past medical history of Diabetes mellitus.  The patient presented to [facility] on 8/3/2020 with a primary complaint of Fall (Patient states that he tripped and fell just PTA - EMS reports patient found on ground, awake with BP 60/P. On arrival, awake, alert, oriented. Abrasions noted to right brow, right elbow, and left knee. Patient insists that he tripped. EMS reports that they believe he passed out. )    The patient was in their usual state of health characterized by daily drinking, until earlier today when he tripped and fell over fueling pump at gas station. He has been trying to cut back on drinking recently, mixing a few shots in a water bottle to drink throughout the day as self medication for withdrawal symptoms.     For the full HPI please refer to the History & Physical from this admission.    Hospital Course By Problem with Pertinent Findings     #Alcohol withdrawal  #Unintentional weight  "loss  #Fall  #Transaminitis  -Patient drinks daily for many months, recently increased during COVID quarantine, endorses approximately 20 lbs weight loss over this time  -Last drink was morning of admission  -tripped and fell day of admission with negative head/neck imaging, superficial scrapes treated in ED  -Patient counseled on the health benefits of alcohol cessation, currently action stage of change, states "I don't want to drink when I leave the hospital"   -Plan for outpatient Valium taper: 5 mg TID for one day, 5 mg BID for two days  -Patient denied inpatient rehab. PT recommending rolling walker and shower chair, but patient declined shower chair.     #Macrocytic anemia  -likely related to malnutrition and EtOH abuse  -Iron studies consistent with chronic inflammation, folate and B12 normal  -Thiamine, folate and MVI     #Thrombocytopenia  -likely related to EtOH abuse  - and stable day of discharge     #AGMA, resolved  #Electrolyte derrangemnt  -Likely related to acute on chronic EtOH abuse and poor nutritional status   -K replaced throughout hospitalization  -Counseled patient to continue eating K rich foods     #T2DM not on long-term insulin  -A1C 5.3 this admission  -discontinue metformin at discharge, restart deferred to PCP     #HLD  -restart home statin at discharge     #HTN  -continue home lisinoprol at discharge    Discharge Medications        Medication List      START taking these medications    diazePAM 5 MG tablet  Commonly known as: VALIUM  Take 1 tablet (5 mg total) by mouth As instructed. Take 1 TAB every 8 hours for 1 day, then 1 TAB every 12 hours for 2 days, then STOP     thiamine 100 MG tablet  Take 1 tablet (100 mg total) by mouth once daily.        CONTINUE taking these medications    lisinopriL 10 MG tablet  TAKE ONE TABLET BY MOUTH EVERY DAY     rosuvastatin 10 MG tablet  Commonly known as: CRESTOR        STOP taking these medications    metFORMIN 750 MG XR 24hr " tablet  Commonly known as: GLUCOPHAGE-XR           Where to Get Your Medications      You can get these medications from any pharmacy    Bring a paper prescription for each of these medications  · diazePAM 5 MG tablet  You don't need a prescription for these medications  · thiamine 100 MG tablet         Discharge Information:   Diet:  regular    Physical Activity:  As tolerated    Instructions:  1. Take all medications as prescribed  2. Keep all follow-up appointments  3. Return to the hospital or call your primary care physicians if any worsening symptoms such as fever, SOB, CP, uncontrolled n/v/d, or new/worsening symptoms occur.    Follow-Up Appointments:  Nader Mace MD in 1 week    Liam Gómez  \[facility] [place] [facility]\"" Internal Medicine, HO-4

## 2020-08-08 NOTE — PROGRESS NOTES
9:10 am,  TN contacted by [facility], Anjali Patricio. OK to pull RW for pt from [facility]. Patient refused ordered Shower Chair.    10:15 am, TN delivered RW to pt bedside. TN verified withpt that he refused Shower Chair, information provided in AVS on where one can be obtained out of pocket, should pt decide he would like one. Signed paperwork delivered to [facility].

## 2020-08-08 NOTE — NURSING
20 gauge PIV to right A/C removed without difficulty, catheter intact- no redness or swelling noted to site at time of removal. Tele monitor removed, cleaned, and returned to telemetry [place]. D/C instructions and medications reviewed with patient per CECILIO Monge- verbalizes understanding. Printed prescription of Valium placed in blue D/C folder. NADN at time of D/C.

## 2020-08-10 ENCOUNTER — PATIENT OUTREACH (OUTPATIENT)
Dept: ADMINISTRATIVE | Facility: CLINIC | Age: 50
End: 2020-08-10

## 2020-08-10 NOTE — TELEPHONE ENCOUNTER
C3 nurse attempted to contact patient. No answer.  C3 nurse attempted to contact Abe Cook  for a TCC post hospital discharge follow up call. No answer at phone number listed and no voicemail available. The patient does not have a scheduled HOSFU appointment within 7-14 days post hospital discharge date 8/8/20.

## 2020-08-11 ENCOUNTER — NURSE TRIAGE (OUTPATIENT)
Dept: ADMINISTRATIVE | Facility: CLINIC | Age: 50
End: 2020-08-11

## 2020-08-11 NOTE — TELEPHONE ENCOUNTER
wife was returning call from a  'Celena' pt was recently discharged from hospital. Call back number     Reason for Disposition   General information question, no triage required and triager able to answer question    Protocols used: INFORMATION ONLY CALL-A-

## 2020-09-23 PROBLEM — E11.42 POLYNEUROPATHY DUE TO TYPE 2 DIABETES MELLITUS: Status: ACTIVE | Noted: 2020-09-23

## 2020-09-23 PROBLEM — Z98.890 HISTORY OF KNEE SURGERY: Status: ACTIVE | Noted: 2020-09-23

## 2020-09-23 PROBLEM — R26.9 GAIT DISTURBANCE: Status: ACTIVE | Noted: 2020-09-23

## 2020-09-23 PROBLEM — E11.42 TYPE 2 DIABETES MELLITUS WITH POLYNEUROPATHY: Status: ACTIVE | Noted: 2020-09-23

## 2020-09-23 PROBLEM — F10.20 ALCOHOLISM: Status: ACTIVE | Noted: 2020-09-23

## 2020-09-23 PROBLEM — E11.65 TYPE 2 DIABETES MELLITUS WITH HYPERGLYCEMIA: Status: RESOLVED | Noted: 2018-07-23 | Resolved: 2020-09-23

## 2020-09-23 PROBLEM — G62.9 NEUROPATHY: Status: ACTIVE | Noted: 2020-09-23

## 2020-10-23 ENCOUNTER — TELEPHONE (OUTPATIENT)
Dept: NEUROLOGY | Facility: CLINIC | Age: 50
End: 2020-10-23

## 2020-10-23 NOTE — TELEPHONE ENCOUNTER
Returned call, spoke with patient's mother.  Requesting an appointment with neurologist for imbalance and fall.  Evaluated by Dr Mace for hospital follow up with gait disturbance due to alcoholism.  Appointment scheduled for 12/16/20 at [facility].  Mother stated he was supposed to have a MRI done, but has not received an appointment, advised to contact Dr Mace's office if this is what he was told.  She stated her son called Neurology and was told he would receive a call back to schedule and he didn't (no message in system that I can see)

## 2020-10-23 NOTE — TELEPHONE ENCOUNTER
----- Message from Stephen Hernandez sent at 10/23/2020  9:11 AM CDT -----  Type:  Needs Medical Advice    Who Called: Mabel Cook (mother)  Symptoms (please be specific): mother is calling to speak with someone in the office regarding getting pt an appt with Dr. Carmona; says someone was supposed to call back but never did   How long has patient had these symptoms:  unknown  Pharmacy name and phone #:  n/a  Would the patient rather a call back or a response via [facility]? Call back  Best Call Back Number: [phone]  Additional Information: pt was referred by Dr. Mace; pt is still having trouble with standing, walking and keeping his balance

## 2020-11-06 ENCOUNTER — LAB VISIT (OUTPATIENT)
Dept: LAB | Facility: HOSPITAL | Age: 50
End: 2020-11-06
Attending: INTERNAL MEDICINE
Payer: COMMERCIAL

## 2020-11-06 DIAGNOSIS — N52.9 IMPOTENCE: ICD-10-CM

## 2020-11-06 LAB — TESTOST SERPL-MCNC: 542 NG/DL (ref 304–1227)

## 2020-11-06 PROCEDURE — 36415 COLL VENOUS BLD VENIPUNCTURE: CPT

## 2020-11-06 PROCEDURE — 84403 ASSAY OF TOTAL TESTOSTERONE: CPT

## 2020-11-11 ENCOUNTER — TELEPHONE (OUTPATIENT)
Dept: NEUROLOGY | Facility: CLINIC | Age: 50
End: 2020-11-11

## 2020-11-11 NOTE — TELEPHONE ENCOUNTER
----- Message from Joyce Gonzalez sent at 11/11/2020  3:16 PM CST -----  Contact: Mother [phone]  Patient is returning your call.  Please advise.

## 2020-11-11 NOTE — TELEPHONE ENCOUNTER
Returned call, offered a sooner appointment with Dr Carmona.  Patient's mother/patient accepted.  Appointment scheduled for 11/12/20 at 0800 hours.  Confirmed date/time/location.

## 2020-11-12 ENCOUNTER — OFFICE VISIT (OUTPATIENT)
Dept: NEUROLOGY | Facility: CLINIC | Age: 50
End: 2020-11-12
Payer: COMMERCIAL

## 2020-11-12 VITALS
OXYGEN SATURATION: 98 % | BODY MASS INDEX: 24.87 KG/M2 | WEIGHT: 168.44 LBS | HEART RATE: 91 BPM | DIASTOLIC BLOOD PRESSURE: 102 MMHG | SYSTOLIC BLOOD PRESSURE: 156 MMHG

## 2020-11-12 DIAGNOSIS — F10.20 ALCOHOLISM: ICD-10-CM

## 2020-11-12 DIAGNOSIS — Z98.890 HISTORY OF KNEE SURGERY: ICD-10-CM

## 2020-11-12 DIAGNOSIS — M54.12 CERVICAL RADICULAR PAIN: ICD-10-CM

## 2020-11-12 DIAGNOSIS — E11.42 TYPE 2 DIABETES MELLITUS WITH POLYNEUROPATHY: ICD-10-CM

## 2020-11-12 DIAGNOSIS — M54.16 LUMBAR RADICULAR SYNDROME: ICD-10-CM

## 2020-11-12 DIAGNOSIS — R26.9 GAIT DISTURBANCE: ICD-10-CM

## 2020-11-12 PROCEDURE — 99999 PR PBB SHADOW E&M-EST. PATIENT-LVL III: ICD-10-PCS | Mod: PBBFAC,,, | Performed by: PSYCHIATRY & NEUROLOGY

## 2020-11-12 PROCEDURE — 3008F PR BODY MASS INDEX (BMI) DOCUMENTED: ICD-10-PCS | Mod: CPTII,S$GLB,, | Performed by: PSYCHIATRY & NEUROLOGY

## 2020-11-12 PROCEDURE — 3044F HG A1C LEVEL LT 7.0%: CPT | Mod: CPTII,S$GLB,, | Performed by: PSYCHIATRY & NEUROLOGY

## 2020-11-12 PROCEDURE — 99204 PR OFFICE/OUTPT VISIT, NEW, LEVL IV, 45-59 MIN: ICD-10-PCS | Mod: S$GLB,,, | Performed by: PSYCHIATRY & NEUROLOGY

## 2020-11-12 PROCEDURE — 1125F PR PAIN SEVERITY QUANTIFIED, PAIN PRESENT: ICD-10-PCS | Mod: S$GLB,,, | Performed by: PSYCHIATRY & NEUROLOGY

## 2020-11-12 PROCEDURE — 99204 OFFICE O/P NEW MOD 45 MIN: CPT | Mod: S$GLB,,, | Performed by: PSYCHIATRY & NEUROLOGY

## 2020-11-12 PROCEDURE — 3044F PR MOST RECENT HEMOGLOBIN A1C LEVEL <7.0%: ICD-10-PCS | Mod: CPTII,S$GLB,, | Performed by: PSYCHIATRY & NEUROLOGY

## 2020-11-12 PROCEDURE — 3008F BODY MASS INDEX DOCD: CPT | Mod: CPTII,S$GLB,, | Performed by: PSYCHIATRY & NEUROLOGY

## 2020-11-12 PROCEDURE — 99999 PR PBB SHADOW E&M-EST. PATIENT-LVL III: CPT | Mod: PBBFAC,,, | Performed by: PSYCHIATRY & NEUROLOGY

## 2020-11-12 PROCEDURE — 1125F AMNT PAIN NOTED PAIN PRSNT: CPT | Mod: S$GLB,,, | Performed by: PSYCHIATRY & NEUROLOGY

## 2020-11-12 NOTE — LETTER
November 12, 2020      Nader Mace MD  [address]  [address]  [facility] [address]           [facility]  [address]  [facility] [address]  Phone: [phone]  Fax: [phone]          Patient: Abe Cook   MR Number: [id number]   YOB: 1970   Date of Visit: 11/12/2020       Dear Dr. Nader Mace:    Thank you for referring Abe Cook to me for evaluation. Attached you will find relevant portions of my assessment and plan of care.    If you have questions, please do not hesitate to call me. I look forward to following Abe Cook along with you.    Sincerely,    Quinton Carmona MD    Enclosure  CC:  No Recipients    If you would like to receive this communication electronically, please contact [email or web address] or [phone] to request more information on [employer] access.    For providers and/or their staff who would like to refer a patient to [facility], please contact us through our one-stop-shop provider referral line, [facility], at [phone].    If you feel you have received this communication in error or would no longer like to receive these types of communications, please e-mail [email or web address]

## 2020-11-12 NOTE — PROGRESS NOTES
[facility] NEUROLOGY  [facility], [place]    Date: 11/12/20  Patient Name: Abe Cook   MRN: [id number]   PCP: Nader Mace  Referring Provider: Nader Mace MD    Assessment:   Abe Cook is a 50 y.o. male Presenting with difficulty walking and frequent falls.  Presentation is multifactorial.  Patient does have evidence of a length-dependent polyneuropathy is likely attributable to both diabetes and alcoholism.  He additionally marked varus deformity of his knees as well as known lumbar radiculopathies and resultant bilateral mild lower extremity weakness.  Additionally, the patient exhibits several signs concerning for parkinsonism including a resting tremor of the 2nd and 3rd digits of his right hand, re-emergence tremor while walking, and right upper extremity rigidity.  That said patient does exhibit diffuse tremulousness which he attributes to his subacute withdrawal.  Following clinically.  Reviewed falls precautions at length.  Plan:     Problem List Items Addressed This Visit        Neuro    Lumbar radicular syndrome    Current Assessment & Plan     -- patient following with ortho         Cervical radicular pain       Psychiatric    Alcoholism    Current Assessment & Plan     -- on multivitamin  -- praised for 2 mos of sobriety            Endocrine    Type 2 diabetes mellitus with polyneuropathy    Current Assessment & Plan     -- last A1C 5.3, down from 7.3            Orthopedic    History of knee surgery    Overview     - had 5 knee surgeries on both knees.  Most recent was in 2015.            Other    Gait disturbance    Current Assessment & Plan     Multifactorial, subtle parkinsonian signs noted as well, following clinically               Quinton Carmona MD  [facility]   Department of Neurology    Patient note was created using MModal Dictation.  Any errors in syntax or even information may not have been identified and edited on initial review prior to  signing this note.  Subjective:   Patient seen in consultation at the request of Nader Mace MD for the evaluation of gait instability. A copy of this note will be sent to the referring physician.        HPI:   Mr. Abe Cook is a 50 y.o. male with a history of severe alcoholism (up to 1 gallon of vodka per day) now sober x2 months and diabetes presenting for evaluation of unsteady gait and falls.  The patient reports a significant increase in his gait instability suffering a fall several patient has a known history of both cervical and lumbar radiculopathies both post surgery as well as bilateral knee replacements.  His left knee is currently locking and popping on him regularly has been told that he needs a repeat knee replacement.  While the patient denies any subjective sensory changes, on exam he is noted to have evidence of reduced vibratory and temperature sensation with decreased proprioception.  He states he is actively engaging in PT with minimal improvement his      PAST MEDICAL HISTORY:  Past Medical History:   Diagnosis Date    Diabetes mellitus        PAST SURGICAL HISTORY:  Past Surgical History:   Procedure Laterality Date    BACK SURGERY      KNEE SURGERY         CURRENT MEDS:  Current Outpatient Medications   Medication Sig Dispense Refill    diazePAM (VALIUM) 5 MG tablet TAKE ONE TABLET BY MOUTH EVERY TWELVE HOURS AS NEEDED 30 tablet 1    lisinopriL 10 MG tablet TAKE ONE TABLET BY MOUTH EVERY DAY 30 tablet 6    metFORMIN (GLUCOPHAGE-XR) 750 MG ER 24hr tablet       rosuvastatin (CRESTOR) 10 MG tablet Take 10 mg by mouth Daily.      zolpidem (AMBIEN) 10 mg Tab TAKE ONE TABLET BY MOUTH EVERYDAY AT BEDTIME AS NEEDED 30 tablet 4     No current facility-administered medications for this visit.        ALLERGIES:  Review of patient's allergies indicates:  No Known Allergies    FAMILY HISTORY:  No family history on file.    SOCIAL HISTORY:  Social History     Tobacco Use    Smoking  status: Current Some Day Smoker   Substance Use Topics    Alcohol use: Not Currently     Comment: HISTORY OF ALCOHOLISM    Drug use: No       Review of Systems:  12 system review of systems is negative except for the symptoms mentioned in HPI.      Objective:     Vitals:    11/12/20 0811   BP: (!) 156/102   BP Location: Right arm   Patient Position: Sitting   Pulse: 91   SpO2: 98%   Weight: 76.4 kg (168 lb 6.9 oz)     General: NAD, well nourished   Eyes: no tearing, discharge, no erythema   ENT: moist mucous membranes of the oral cavity, nares patent    Neck: Supple, full range of motion  Cardiovascular: Warm and well perfused, pulses equal and symmetrical  Lungs: Normal work of breathing, normal chest wall excursions  Skin: No rash, lesions, or breakdown on exposed skin  Psychiatry: Mood and affect are appropriate   Abdomen: soft, non tender, non distended  Extremeties: No cyanosis, clubbing or edema.    Neurological   MENTAL STATUS: Alert and oriented to person, place, and time. Attention and concentration within normal limits. Speech without dysarthria, able to name and repeat without difficulty. Recent and remote memory within normal limits   CRANIAL NERVES: Visual fields intact. PERRL. EOMI. Facial sensation intact. Face symmetrical. Hearing grossly intact. Full shoulder shrug bilaterally. Tongue protrudes midline   SENSORY: Sensation is reduced to temp and vibration to ankles.  Joint position perception reduced. Positive Romberg.   MOTOR: Normal bulk and increased tone in RUE. No pronator drift. Resting tremor of digits 2 and 3 of RUE, reemerges with walking. Diffuse tremulousness also noted. 5/5 deltoid, biceps, triceps, interosseous, hand  bilaterally. 4/5 iliopsoas, knee extension/flexion, 5/5 foot dorsi/plantarflexion bilaterally.    REFLEXES: Symmetric and absent throughout LEs.    CEREBELLAR/COORDINATION/GAIT: Gait steady wide based. NOrmal turns.  Heel to shin intact. Finger to nose intact. Normal  rapid alternating movements.

## 2021-01-21 ENCOUNTER — HOSPITAL ENCOUNTER (OUTPATIENT)
Dept: RADIOLOGY | Facility: HOSPITAL | Age: 51
Discharge: HOME | End: 2021-01-21
Attending: INTERNAL MEDICINE
Payer: COMMERCIAL

## 2021-01-21 DIAGNOSIS — M54.2 CERVICALGIA: ICD-10-CM

## 2021-01-21 DIAGNOSIS — R27.0 ATAXIA, UNSPECIFIED: ICD-10-CM

## 2021-01-21 PROCEDURE — 70450 CT HEAD/BRAIN W/O DYE: CPT | Mod: TC

## 2021-01-21 PROCEDURE — 70450 CT HEAD/BRAIN W/O DYE: CPT | Mod: 26,,, | Performed by: RADIOLOGY

## 2021-01-21 PROCEDURE — 72125 CT CERVICAL SPINE WITHOUT CONTRAST: ICD-10-PCS | Mod: 26,,, | Performed by: RADIOLOGY

## 2021-01-21 PROCEDURE — 72125 CT NECK SPINE W/O DYE: CPT | Mod: 26,,, | Performed by: RADIOLOGY

## 2021-01-21 PROCEDURE — 72125 CT NECK SPINE W/O DYE: CPT | Mod: TC

## 2021-01-21 PROCEDURE — 70450 CT HEAD WITHOUT CONTRAST: ICD-10-PCS | Mod: 26,,, | Performed by: RADIOLOGY

## 2021-01-25 ENCOUNTER — HOSPITAL ENCOUNTER (INPATIENT)
Facility: HOSPITAL | Age: 51
LOS: 4 days | Discharge: HOME | DRG: 897 | End: 2021-01-29
Attending: EMERGENCY MEDICINE | Admitting: INTERNAL MEDICINE
Payer: COMMERCIAL

## 2021-01-25 DIAGNOSIS — F10.930 ALCOHOL WITHDRAWAL SYNDROME WITHOUT COMPLICATION: Primary | ICD-10-CM

## 2021-01-25 DIAGNOSIS — R25.1 TREMOR, UNSPECIFIED: ICD-10-CM

## 2021-01-25 DIAGNOSIS — R27.0 ATAXIA: ICD-10-CM

## 2021-01-25 DIAGNOSIS — E83.42 HYPOMAGNESEMIA: ICD-10-CM

## 2021-01-25 DIAGNOSIS — M25.562 PAIN AND SWELLING OF LEFT KNEE: ICD-10-CM

## 2021-01-25 DIAGNOSIS — F10.939 ALCOHOL WITHDRAWAL SYNDROME WITH COMPLICATION: ICD-10-CM

## 2021-01-25 DIAGNOSIS — M25.462 PAIN AND SWELLING OF LEFT KNEE: ICD-10-CM

## 2021-01-25 DIAGNOSIS — R53.1 WEAKNESS: ICD-10-CM

## 2021-01-25 DIAGNOSIS — F10.231 ALCOHOL DEPENDENCE WITH WITHDRAWAL DELIRIUM: ICD-10-CM

## 2021-01-25 DIAGNOSIS — F10.939 ALCOHOL WITHDRAWAL: ICD-10-CM

## 2021-01-25 DIAGNOSIS — I10 ESSENTIAL (PRIMARY) HYPERTENSION: ICD-10-CM

## 2021-01-25 DIAGNOSIS — F10.20 ALCOHOL USE DISORDER, SEVERE, DEPENDENCE: Chronic | ICD-10-CM

## 2021-01-25 LAB
ALBUMIN SERPL BCP-MCNC: 3.7 G/DL (ref 3.5–5.2)
ALP SERPL-CCNC: 93 U/L (ref 55–135)
ALT SERPL W/O P-5'-P-CCNC: 64 U/L (ref 10–44)
ANION GAP SERPL CALC-SCNC: 14 MMOL/L (ref 8–16)
AST SERPL-CCNC: 79 U/L (ref 10–40)
BACTERIA #/AREA URNS AUTO: ABNORMAL /HPF
BASOPHILS # BLD AUTO: 0.02 K/UL (ref 0–0.2)
BASOPHILS NFR BLD: 0.4 % (ref 0–1.9)
BILIRUB SERPL-MCNC: 1 MG/DL (ref 0.1–1)
BILIRUB UR QL STRIP: ABNORMAL
BUN SERPL-MCNC: 12 MG/DL (ref 6–30)
BUN SERPL-MCNC: 13 MG/DL (ref 6–20)
CALCIUM SERPL-MCNC: 9.5 MG/DL (ref 8.7–10.5)
CHLORIDE SERPL-SCNC: 103 MMOL/L (ref 95–110)
CHLORIDE SERPL-SCNC: 103 MMOL/L (ref 95–110)
CLARITY UR REFRACT.AUTO: ABNORMAL
CO2 SERPL-SCNC: 27 MMOL/L (ref 23–29)
COLOR UR AUTO: ABNORMAL
CREAT SERPL-MCNC: 0.6 MG/DL (ref 0.5–1.4)
CREAT SERPL-MCNC: 0.8 MG/DL (ref 0.5–1.4)
CTP QC/QA: YES
DIFFERENTIAL METHOD: ABNORMAL
EOSINOPHIL # BLD AUTO: 0 K/UL (ref 0–0.5)
EOSINOPHIL NFR BLD: 0.2 % (ref 0–8)
ERYTHROCYTE [DISTWIDTH] IN BLOOD BY AUTOMATED COUNT: 14.5 % (ref 11.5–14.5)
EST. GFR  (AFRICAN AMERICAN): >60 ML/MIN/1.73 M^2
EST. GFR  (NON AFRICAN AMERICAN): >60 ML/MIN/1.73 M^2
GLUCOSE SERPL-MCNC: 149 MG/DL (ref 70–110)
GLUCOSE SERPL-MCNC: 176 MG/DL (ref 70–110)
GLUCOSE SERPL-MCNC: 177 MG/DL (ref 70–110)
GLUCOSE UR QL STRIP: NEGATIVE
HCT VFR BLD AUTO: 38.3 % (ref 40–54)
HCT VFR BLD CALC: 38 %PCV (ref 36–54)
HGB BLD-MCNC: 12.5 G/DL (ref 14–18)
HGB UR QL STRIP: NEGATIVE
HYALINE CASTS UR QL AUTO: 5 /LPF
IMM GRANULOCYTES # BLD AUTO: 0.01 K/UL (ref 0–0.04)
IMM GRANULOCYTES NFR BLD AUTO: 0.2 % (ref 0–0.5)
KETONES UR QL STRIP: ABNORMAL
LEUKOCYTE ESTERASE UR QL STRIP: ABNORMAL
LYMPHOCYTES # BLD AUTO: 0.5 K/UL (ref 1–4.8)
LYMPHOCYTES NFR BLD: 10.1 % (ref 18–48)
MAGNESIUM SERPL-MCNC: 1.3 MG/DL (ref 1.6–2.6)
MCH RBC QN AUTO: 32.9 PG (ref 27–31)
MCHC RBC AUTO-ENTMCNC: 32.6 G/DL (ref 32–36)
MCV RBC AUTO: 101 FL (ref 82–98)
MICROSCOPIC COMMENT: ABNORMAL
MONOCYTES # BLD AUTO: 0.5 K/UL (ref 0.3–1)
MONOCYTES NFR BLD: 9.3 % (ref 4–15)
NEUTROPHILS # BLD AUTO: 4 K/UL (ref 1.8–7.7)
NEUTROPHILS NFR BLD: 79.8 % (ref 38–73)
NITRITE UR QL STRIP: NEGATIVE
NRBC BLD-RTO: 0 /100 WBC
PH UR STRIP: 5 [PH] (ref 5–8)
PLATELET # BLD AUTO: 104 K/UL (ref 150–350)
PMV BLD AUTO: 9.6 FL (ref 9.2–12.9)
POC IONIZED CALCIUM: 1.1 MMOL/L (ref 1.06–1.42)
POC TCO2 (MEASURED): 27 MMOL/L (ref 23–29)
POCT GLUCOSE: 149 MG/DL (ref 70–110)
POTASSIUM BLD-SCNC: 4.4 MMOL/L (ref 3.5–5.1)
POTASSIUM SERPL-SCNC: 4.3 MMOL/L (ref 3.5–5.1)
PROT SERPL-MCNC: 8.1 G/DL (ref 6–8.4)
PROT UR QL STRIP: ABNORMAL
RBC # BLD AUTO: 3.8 M/UL (ref 4.6–6.2)
RBC #/AREA URNS AUTO: 5 /HPF (ref 0–4)
SAMPLE: ABNORMAL
SARS-COV-2 RDRP RESP QL NAA+PROBE: NEGATIVE
SODIUM BLD-SCNC: 140 MMOL/L (ref 136–145)
SODIUM SERPL-SCNC: 144 MMOL/L (ref 136–145)
SP GR UR STRIP: >=1.03 (ref 1–1.03)
URN SPEC COLLECT METH UR: ABNORMAL
WBC # BLD AUTO: 4.97 K/UL (ref 3.9–12.7)
WBC #/AREA URNS AUTO: 12 /HPF (ref 0–5)

## 2021-01-25 PROCEDURE — 85025 COMPLETE CBC W/AUTO DIFF WBC: CPT

## 2021-01-25 PROCEDURE — 63600175 PHARM REV CODE 636 W HCPCS: Performed by: EMERGENCY MEDICINE

## 2021-01-25 PROCEDURE — 96365 THER/PROPH/DIAG IV INF INIT: CPT

## 2021-01-25 PROCEDURE — 12000002 HC ACUTE/MED SURGE SEMI-PRIVATE ROOM

## 2021-01-25 PROCEDURE — 25000003 PHARM REV CODE 250: Performed by: STUDENT IN AN ORGANIZED HEALTH CARE EDUCATION/TRAINING PROGRAM

## 2021-01-25 PROCEDURE — 83735 ASSAY OF MAGNESIUM: CPT

## 2021-01-25 PROCEDURE — 25000003 PHARM REV CODE 250: Performed by: PHYSICIAN ASSISTANT

## 2021-01-25 PROCEDURE — 96375 TX/PRO/DX INJ NEW DRUG ADDON: CPT

## 2021-01-25 PROCEDURE — 80307 DRUG TEST PRSMV CHEM ANLYZR: CPT

## 2021-01-25 PROCEDURE — 96376 TX/PRO/DX INJ SAME DRUG ADON: CPT

## 2021-01-25 PROCEDURE — 63600175 PHARM REV CODE 636 W HCPCS: Performed by: STUDENT IN AN ORGANIZED HEALTH CARE EDUCATION/TRAINING PROGRAM

## 2021-01-25 PROCEDURE — 86703 HIV-1/HIV-2 1 RESULT ANTBDY: CPT

## 2021-01-25 PROCEDURE — U0002 COVID-19 LAB TEST NON-CDC: HCPCS | Performed by: PHYSICIAN ASSISTANT

## 2021-01-25 PROCEDURE — 81001 URINALYSIS AUTO W/SCOPE: CPT

## 2021-01-25 PROCEDURE — 99291 CRITICAL CARE FIRST HOUR: CPT | Mod: 25

## 2021-01-25 PROCEDURE — 86803 HEPATITIS C AB TEST: CPT

## 2021-01-25 PROCEDURE — 80047 BASIC METABLC PNL IONIZED CA: CPT | Mod: 59

## 2021-01-25 PROCEDURE — 99291 PR CRITICAL CARE, E/M 30-74 MINUTES: ICD-10-PCS | Mod: CS,,, | Performed by: EMERGENCY MEDICINE

## 2021-01-25 PROCEDURE — 87086 URINE CULTURE/COLONY COUNT: CPT

## 2021-01-25 PROCEDURE — 80053 COMPREHEN METABOLIC PANEL: CPT

## 2021-01-25 PROCEDURE — 93005 ELECTROCARDIOGRAM TRACING: CPT

## 2021-01-25 PROCEDURE — 93010 ELECTROCARDIOGRAM REPORT: CPT | Mod: ,,, | Performed by: INTERNAL MEDICINE

## 2021-01-25 PROCEDURE — 93010 EKG 12-LEAD: ICD-10-PCS | Mod: ,,, | Performed by: INTERNAL MEDICINE

## 2021-01-25 PROCEDURE — 96361 HYDRATE IV INFUSION ADD-ON: CPT

## 2021-01-25 PROCEDURE — 83036 HEMOGLOBIN GLYCOSYLATED A1C: CPT

## 2021-01-25 PROCEDURE — 99291 CRITICAL CARE FIRST HOUR: CPT | Mod: CS,,, | Performed by: EMERGENCY MEDICINE

## 2021-01-25 PROCEDURE — 83690 ASSAY OF LIPASE: CPT

## 2021-01-25 RX ORDER — SODIUM CHLORIDE, SODIUM LACTATE, POTASSIUM CHLORIDE, CALCIUM CHLORIDE 600; 310; 30; 20 MG/100ML; MG/100ML; MG/100ML; MG/100ML
INJECTION, SOLUTION INTRAVENOUS CONTINUOUS
Status: DISCONTINUED | OUTPATIENT
Start: 2021-01-26 | End: 2021-01-26

## 2021-01-25 RX ORDER — THIAMINE HCL 100 MG
500 TABLET ORAL ONCE
Status: COMPLETED | OUTPATIENT
Start: 2021-01-25 | End: 2021-01-25

## 2021-01-25 RX ORDER — DIAZEPAM 10 MG/2ML
10 INJECTION INTRAMUSCULAR
Status: COMPLETED | OUTPATIENT
Start: 2021-01-25 | End: 2021-01-25

## 2021-01-25 RX ORDER — SODIUM CHLORIDE 0.9 % (FLUSH) 0.9 %
10 SYRINGE (ML) INJECTION
Status: DISCONTINUED | OUTPATIENT
Start: 2021-01-26 | End: 2021-01-29 | Stop reason: HOSPADM

## 2021-01-25 RX ORDER — ONDANSETRON 2 MG/ML
8 INJECTION INTRAMUSCULAR; INTRAVENOUS
Status: COMPLETED | OUTPATIENT
Start: 2021-01-25 | End: 2021-01-25

## 2021-01-25 RX ORDER — LORAZEPAM 2 MG/ML
2 INJECTION INTRAMUSCULAR
Status: DISCONTINUED | OUTPATIENT
Start: 2021-01-26 | End: 2021-01-26

## 2021-01-25 RX ORDER — MAGNESIUM SULFATE HEPTAHYDRATE 40 MG/ML
2 INJECTION, SOLUTION INTRAVENOUS
Status: COMPLETED | OUTPATIENT
Start: 2021-01-25 | End: 2021-01-26

## 2021-01-25 RX ORDER — INSULIN ASPART 100 [IU]/ML
0-5 INJECTION, SOLUTION INTRAVENOUS; SUBCUTANEOUS EVERY 6 HOURS PRN
Status: DISCONTINUED | OUTPATIENT
Start: 2021-01-26 | End: 2021-01-29 | Stop reason: HOSPADM

## 2021-01-25 RX ORDER — ROSUVASTATIN CALCIUM 10 MG/1
10 TABLET, COATED ORAL DAILY
Status: DISCONTINUED | OUTPATIENT
Start: 2021-01-26 | End: 2021-01-25

## 2021-01-25 RX ORDER — GLUCAGON 1 MG
1 KIT INJECTION
Status: DISCONTINUED | OUTPATIENT
Start: 2021-01-26 | End: 2021-01-29 | Stop reason: HOSPADM

## 2021-01-25 RX ORDER — LORAZEPAM 2 MG/ML
2 INJECTION INTRAMUSCULAR
Status: DISCONTINUED | OUTPATIENT
Start: 2021-01-25 | End: 2021-01-25

## 2021-01-25 RX ORDER — SODIUM CHLORIDE 9 MG/ML
INJECTION, SOLUTION INTRAVENOUS CONTINUOUS
Status: DISCONTINUED | OUTPATIENT
Start: 2021-01-26 | End: 2021-01-25

## 2021-01-25 RX ORDER — ENOXAPARIN SODIUM 100 MG/ML
40 INJECTION SUBCUTANEOUS EVERY 24 HOURS
Status: DISCONTINUED | OUTPATIENT
Start: 2021-01-26 | End: 2021-01-26

## 2021-01-25 RX ADMIN — THIAMINE HCL TAB 100 MG 500 MG: 100 TAB at 09:01

## 2021-01-25 RX ADMIN — SODIUM CHLORIDE 1000 ML: 0.9 INJECTION, SOLUTION INTRAVENOUS at 08:01

## 2021-01-25 RX ADMIN — DIAZEPAM 10 MG: 10 INJECTION, SOLUTION INTRAMUSCULAR; INTRAVENOUS at 08:01

## 2021-01-25 RX ADMIN — MAGNESIUM SULFATE 2 G: 2 INJECTION INTRAVENOUS at 11:01

## 2021-01-25 RX ADMIN — DIAZEPAM 10 MG: 10 INJECTION, SOLUTION INTRAMUSCULAR; INTRAVENOUS at 11:01

## 2021-01-25 RX ADMIN — DIAZEPAM 10 MG: 10 INJECTION, SOLUTION INTRAMUSCULAR; INTRAVENOUS at 10:01

## 2021-01-25 RX ADMIN — ONDANSETRON 8 MG: 2 INJECTION INTRAMUSCULAR; INTRAVENOUS at 11:01

## 2021-01-26 PROBLEM — F10.239 ALCOHOL DEPENDENCE WITH WITHDRAWAL: Status: ACTIVE | Noted: 2020-09-23

## 2021-01-26 LAB
ALBUMIN SERPL BCP-MCNC: 3 G/DL (ref 3.5–5.2)
ALP SERPL-CCNC: 74 U/L (ref 55–135)
ALT SERPL W/O P-5'-P-CCNC: 43 U/L (ref 10–44)
AMPHET+METHAMPHET UR QL: NEGATIVE
ANION GAP SERPL CALC-SCNC: 13 MMOL/L (ref 8–16)
AST SERPL-CCNC: 48 U/L (ref 10–40)
BARBITURATES UR QL SCN>200 NG/ML: NEGATIVE
BENZODIAZ UR QL SCN>200 NG/ML: ABNORMAL
BILIRUB SERPL-MCNC: 1.2 MG/DL (ref 0.1–1)
BUN SERPL-MCNC: 11 MG/DL (ref 6–20)
BZE UR QL SCN: NEGATIVE
CALCIUM SERPL-MCNC: 8.3 MG/DL (ref 8.7–10.5)
CANNABINOIDS UR QL SCN: NEGATIVE
CHLORIDE SERPL-SCNC: 105 MMOL/L (ref 95–110)
CO2 SERPL-SCNC: 22 MMOL/L (ref 23–29)
CREAT SERPL-MCNC: 0.6 MG/DL (ref 0.5–1.4)
CREAT UR-MCNC: 432 MG/DL (ref 23–375)
EST. GFR  (AFRICAN AMERICAN): >60 ML/MIN/1.73 M^2
EST. GFR  (NON AFRICAN AMERICAN): >60 ML/MIN/1.73 M^2
ESTIMATED AVG GLUCOSE: 123 MG/DL (ref 68–131)
ETHANOL UR-MCNC: <10 MG/DL
GLUCOSE SERPL-MCNC: 123 MG/DL (ref 70–110)
HBA1C MFR BLD HPLC: 5.9 % (ref 4–5.6)
HCV AB SERPL QL IA: NEGATIVE
HIV 1+2 AB+HIV1 P24 AG SERPL QL IA: NEGATIVE
LIPASE SERPL-CCNC: 56 U/L (ref 4–60)
MAGNESIUM SERPL-MCNC: 1.5 MG/DL (ref 1.6–2.6)
METHADONE UR QL SCN>300 NG/ML: NEGATIVE
OPIATES UR QL SCN: NEGATIVE
PCP UR QL SCN>25 NG/ML: NEGATIVE
PHOSPHATE SERPL-MCNC: 2.3 MG/DL (ref 2.7–4.5)
POCT GLUCOSE: 144 MG/DL (ref 70–110)
POTASSIUM SERPL-SCNC: 3.8 MMOL/L (ref 3.5–5.1)
PROT SERPL-MCNC: 6.6 G/DL (ref 6–8.4)
SODIUM SERPL-SCNC: 140 MMOL/L (ref 136–145)
TOXICOLOGY INFORMATION: ABNORMAL

## 2021-01-26 PROCEDURE — 83735 ASSAY OF MAGNESIUM: CPT

## 2021-01-26 PROCEDURE — 63600175 PHARM REV CODE 636 W HCPCS: Performed by: INTERNAL MEDICINE

## 2021-01-26 PROCEDURE — 20600001 HC STEP DOWN PRIVATE ROOM

## 2021-01-26 PROCEDURE — 63600175 PHARM REV CODE 636 W HCPCS: Performed by: STUDENT IN AN ORGANIZED HEALTH CARE EDUCATION/TRAINING PROGRAM

## 2021-01-26 PROCEDURE — 63600175 PHARM REV CODE 636 W HCPCS: Performed by: HOSPITALIST

## 2021-01-26 PROCEDURE — 25000003 PHARM REV CODE 250: Performed by: STUDENT IN AN ORGANIZED HEALTH CARE EDUCATION/TRAINING PROGRAM

## 2021-01-26 PROCEDURE — 84100 ASSAY OF PHOSPHORUS: CPT

## 2021-01-26 PROCEDURE — 99233 SBSQ HOSP IP/OBS HIGH 50: CPT | Mod: ,,, | Performed by: INTERNAL MEDICINE

## 2021-01-26 PROCEDURE — 94761 N-INVAS EAR/PLS OXIMETRY MLT: CPT

## 2021-01-26 PROCEDURE — 80053 COMPREHEN METABOLIC PANEL: CPT

## 2021-01-26 PROCEDURE — 99233 PR SUBSEQUENT HOSPITAL CARE,LEVL III: ICD-10-PCS | Mod: ,,, | Performed by: INTERNAL MEDICINE

## 2021-01-26 PROCEDURE — 25000003 PHARM REV CODE 250: Performed by: HOSPITALIST

## 2021-01-26 RX ORDER — DIAZEPAM 2 MG/1
2 TABLET ORAL 2 TIMES DAILY
Status: DISCONTINUED | OUTPATIENT
Start: 2021-01-28 | End: 2021-01-26

## 2021-01-26 RX ORDER — DIAZEPAM 5 MG/1
5 TABLET ORAL 2 TIMES DAILY
Status: DISCONTINUED | OUTPATIENT
Start: 2021-01-27 | End: 2021-01-26

## 2021-01-26 RX ORDER — DIAZEPAM 5 MG/1
5 TABLET ORAL EVERY 6 HOURS
Status: DISCONTINUED | OUTPATIENT
Start: 2021-01-26 | End: 2021-01-26

## 2021-01-26 RX ORDER — ONDANSETRON 8 MG/1
8 TABLET, ORALLY DISINTEGRATING ORAL EVERY 8 HOURS PRN
Status: DISCONTINUED | OUTPATIENT
Start: 2021-01-26 | End: 2021-01-29 | Stop reason: HOSPADM

## 2021-01-26 RX ORDER — THIAMINE HCL 100 MG
100 TABLET ORAL DAILY
Status: DISCONTINUED | OUTPATIENT
Start: 2021-01-26 | End: 2021-01-29 | Stop reason: HOSPADM

## 2021-01-26 RX ORDER — DIAZEPAM 5 MG/1
10 TABLET ORAL EVERY 6 HOURS
Status: DISCONTINUED | OUTPATIENT
Start: 2021-01-26 | End: 2021-01-28

## 2021-01-26 RX ORDER — DIAZEPAM 5 MG/1
10 TABLET ORAL EVERY 6 HOURS
Status: DISCONTINUED | OUTPATIENT
Start: 2021-01-26 | End: 2021-01-26

## 2021-01-26 RX ORDER — DIAZEPAM 5 MG/1
5 TABLET ORAL EVERY 8 HOURS
Status: DISCONTINUED | OUTPATIENT
Start: 2021-01-26 | End: 2021-01-26

## 2021-01-26 RX ORDER — LISINOPRIL 10 MG/1
10 TABLET ORAL DAILY
Status: DISCONTINUED | OUTPATIENT
Start: 2021-01-26 | End: 2021-01-29 | Stop reason: HOSPADM

## 2021-01-26 RX ORDER — LORAZEPAM 0.5 MG/1
2 TABLET ORAL EVERY 4 HOURS PRN
Status: DISCONTINUED | OUTPATIENT
Start: 2021-01-26 | End: 2021-01-26

## 2021-01-26 RX ORDER — ROSUVASTATIN CALCIUM 10 MG/1
10 TABLET, COATED ORAL NIGHTLY
Status: DISCONTINUED | OUTPATIENT
Start: 2021-01-26 | End: 2021-01-29 | Stop reason: HOSPADM

## 2021-01-26 RX ORDER — LORAZEPAM 2 MG/ML
2 INJECTION INTRAMUSCULAR
Status: DISCONTINUED | OUTPATIENT
Start: 2021-01-26 | End: 2021-01-27

## 2021-01-26 RX ORDER — NAPROXEN 250 MG/1
250 TABLET ORAL 2 TIMES DAILY PRN
Status: DISCONTINUED | OUTPATIENT
Start: 2021-01-26 | End: 2021-01-29 | Stop reason: HOSPADM

## 2021-01-26 RX ORDER — MAGNESIUM SULFATE HEPTAHYDRATE 40 MG/ML
2 INJECTION, SOLUTION INTRAVENOUS ONCE
Status: COMPLETED | OUTPATIENT
Start: 2021-01-26 | End: 2021-01-26

## 2021-01-26 RX ADMIN — MAGNESIUM SULFATE 2 G: 2 INJECTION INTRAVENOUS at 02:01

## 2021-01-26 RX ADMIN — THERA TABS 1 TABLET: TAB at 11:01

## 2021-01-26 RX ADMIN — ONDANSETRON 8 MG: 8 TABLET, ORALLY DISINTEGRATING ORAL at 10:01

## 2021-01-26 RX ADMIN — LORAZEPAM 2 MG: 2 INJECTION INTRAMUSCULAR; INTRAVENOUS at 10:01

## 2021-01-26 RX ADMIN — Medication 100 MG: at 11:01

## 2021-01-26 RX ADMIN — ROSUVASTATIN CALCIUM 10 MG: 10 TABLET, COATED ORAL at 09:01

## 2021-01-26 RX ADMIN — NAPROXEN 250 MG: 250 TABLET ORAL at 05:01

## 2021-01-26 RX ADMIN — POTASSIUM PHOSPHATE, MONOBASIC AND POTASSIUM PHOSPHATE, DIBASIC 15 MMOL: 224; 236 INJECTION, SOLUTION, CONCENTRATE INTRAVENOUS at 03:01

## 2021-01-26 RX ADMIN — PROMETHAZINE HYDROCHLORIDE 12.5 MG: 25 INJECTION INTRAMUSCULAR; INTRAVENOUS at 05:01

## 2021-01-26 RX ADMIN — LISINOPRIL 10 MG: 10 TABLET ORAL at 02:01

## 2021-01-26 RX ADMIN — DIAZEPAM 10 MG: 5 TABLET ORAL at 11:01

## 2021-01-26 RX ADMIN — SODIUM CHLORIDE, SODIUM LACTATE, POTASSIUM CHLORIDE, AND CALCIUM CHLORIDE: .6; .31; .03; .02 INJECTION, SOLUTION INTRAVENOUS at 12:01

## 2021-01-26 RX ADMIN — NAPROXEN 250 MG: 250 TABLET ORAL at 11:01

## 2021-01-26 RX ADMIN — THIAMINE HYDROCHLORIDE: 100 INJECTION, SOLUTION INTRAMUSCULAR; INTRAVENOUS at 02:01

## 2021-01-26 RX ADMIN — DIAZEPAM 10 MG: 5 TABLET ORAL at 09:01

## 2021-01-26 RX ADMIN — LORAZEPAM 2 MG: 2 INJECTION INTRAMUSCULAR; INTRAVENOUS at 03:01

## 2021-01-26 RX ADMIN — LORAZEPAM 2 MG: 2 INJECTION INTRAMUSCULAR; INTRAVENOUS at 05:01

## 2021-01-26 RX ADMIN — LORAZEPAM 2 MG: 2 INJECTION INTRAMUSCULAR; INTRAVENOUS at 08:01

## 2021-01-27 PROBLEM — R25.1 TREMOR, UNSPECIFIED: Status: ACTIVE | Noted: 2021-01-27

## 2021-01-27 PROBLEM — M25.562 PAIN AND SWELLING OF LEFT KNEE: Status: ACTIVE | Noted: 2021-01-27

## 2021-01-27 PROBLEM — F10.20 ALCOHOL USE DISORDER, SEVERE, DEPENDENCE: Chronic | Status: ACTIVE | Noted: 2020-09-23

## 2021-01-27 PROBLEM — F10.931 ALCOHOL WITHDRAWAL DELIRIUM, ACUTE, HYPERACTIVE: Status: RESOLVED | Noted: 2020-08-03 | Resolved: 2021-01-27

## 2021-01-27 PROBLEM — F10.939 ALCOHOL WITHDRAWAL SYNDROME WITH COMPLICATION: Status: RESOLVED | Noted: 2020-08-03 | Resolved: 2021-01-27

## 2021-01-27 PROBLEM — M25.462 PAIN AND SWELLING OF LEFT KNEE: Status: ACTIVE | Noted: 2021-01-27

## 2021-01-27 LAB
ALBUMIN SERPL BCP-MCNC: 2.9 G/DL (ref 3.5–5.2)
ALP SERPL-CCNC: 78 U/L (ref 55–135)
ALT SERPL W/O P-5'-P-CCNC: 35 U/L (ref 10–44)
ANION GAP SERPL CALC-SCNC: 12 MMOL/L (ref 8–16)
AST SERPL-CCNC: 39 U/L (ref 10–40)
BACTERIA UR CULT: NO GROWTH
BASOPHILS # BLD AUTO: 0.01 K/UL (ref 0–0.2)
BASOPHILS NFR BLD: 0.3 % (ref 0–1.9)
BILIRUB SERPL-MCNC: 1.2 MG/DL (ref 0.1–1)
BUN SERPL-MCNC: 7 MG/DL (ref 6–20)
CALCIUM SERPL-MCNC: 8.6 MG/DL (ref 8.7–10.5)
CHLORIDE SERPL-SCNC: 109 MMOL/L (ref 95–110)
CO2 SERPL-SCNC: 22 MMOL/L (ref 23–29)
CREAT SERPL-MCNC: 0.7 MG/DL (ref 0.5–1.4)
DIFFERENTIAL METHOD: ABNORMAL
EOSINOPHIL # BLD AUTO: 0 K/UL (ref 0–0.5)
EOSINOPHIL NFR BLD: 0.8 % (ref 0–8)
ERYTHROCYTE [DISTWIDTH] IN BLOOD BY AUTOMATED COUNT: 14.3 % (ref 11.5–14.5)
EST. GFR  (AFRICAN AMERICAN): >60 ML/MIN/1.73 M^2
EST. GFR  (NON AFRICAN AMERICAN): >60 ML/MIN/1.73 M^2
GLUCOSE SERPL-MCNC: 107 MG/DL (ref 70–110)
HCT VFR BLD AUTO: 34.5 % (ref 40–54)
HGB BLD-MCNC: 11.3 G/DL (ref 14–18)
IMM GRANULOCYTES # BLD AUTO: 0 K/UL (ref 0–0.04)
IMM GRANULOCYTES NFR BLD AUTO: 0 % (ref 0–0.5)
LYMPHOCYTES # BLD AUTO: 0.8 K/UL (ref 1–4.8)
LYMPHOCYTES NFR BLD: 21.5 % (ref 18–48)
MAGNESIUM SERPL-MCNC: 1.6 MG/DL (ref 1.6–2.6)
MCH RBC QN AUTO: 33.1 PG (ref 27–31)
MCHC RBC AUTO-ENTMCNC: 32.8 G/DL (ref 32–36)
MCV RBC AUTO: 101 FL (ref 82–98)
MONOCYTES # BLD AUTO: 0.4 K/UL (ref 0.3–1)
MONOCYTES NFR BLD: 11.6 % (ref 4–15)
NEUTROPHILS # BLD AUTO: 2.4 K/UL (ref 1.8–7.7)
NEUTROPHILS NFR BLD: 65.8 % (ref 38–73)
NRBC BLD-RTO: 0 /100 WBC
PHOSPHATE SERPL-MCNC: 3.3 MG/DL (ref 2.7–4.5)
PLATELET # BLD AUTO: 85 K/UL (ref 150–350)
PMV BLD AUTO: 10.4 FL (ref 9.2–12.9)
POCT GLUCOSE: 111 MG/DL (ref 70–110)
POCT GLUCOSE: 115 MG/DL (ref 70–110)
POCT GLUCOSE: 116 MG/DL (ref 70–110)
POCT GLUCOSE: 124 MG/DL (ref 70–110)
POCT GLUCOSE: 146 MG/DL (ref 70–110)
POTASSIUM SERPL-SCNC: 3.5 MMOL/L (ref 3.5–5.1)
PROT SERPL-MCNC: 6.4 G/DL (ref 6–8.4)
RBC # BLD AUTO: 3.41 M/UL (ref 4.6–6.2)
SODIUM SERPL-SCNC: 143 MMOL/L (ref 136–145)
WBC # BLD AUTO: 3.62 K/UL (ref 3.9–12.7)

## 2021-01-27 PROCEDURE — 80053 COMPREHEN METABOLIC PANEL: CPT

## 2021-01-27 PROCEDURE — 99233 PR SUBSEQUENT HOSPITAL CARE,LEVL III: ICD-10-PCS | Mod: ,,, | Performed by: HOSPITALIST

## 2021-01-27 PROCEDURE — 99223 1ST HOSP IP/OBS HIGH 75: CPT | Mod: ,,, | Performed by: PSYCHIATRY & NEUROLOGY

## 2021-01-27 PROCEDURE — 63600175 PHARM REV CODE 636 W HCPCS: Performed by: HOSPITALIST

## 2021-01-27 PROCEDURE — 99223 PR INITIAL HOSPITAL CARE,LEVL III: ICD-10-PCS | Mod: ,,, | Performed by: PSYCHIATRY & NEUROLOGY

## 2021-01-27 PROCEDURE — 99233 SBSQ HOSP IP/OBS HIGH 50: CPT | Mod: ,,, | Performed by: HOSPITALIST

## 2021-01-27 PROCEDURE — 85025 COMPLETE CBC W/AUTO DIFF WBC: CPT

## 2021-01-27 PROCEDURE — 20600001 HC STEP DOWN PRIVATE ROOM

## 2021-01-27 PROCEDURE — 83735 ASSAY OF MAGNESIUM: CPT

## 2021-01-27 PROCEDURE — 84100 ASSAY OF PHOSPHORUS: CPT

## 2021-01-27 PROCEDURE — 25000003 PHARM REV CODE 250: Performed by: STUDENT IN AN ORGANIZED HEALTH CARE EDUCATION/TRAINING PROGRAM

## 2021-01-27 RX ORDER — LORAZEPAM 2 MG/ML
2 INJECTION INTRAMUSCULAR EVERY 4 HOURS PRN
Status: DISCONTINUED | OUTPATIENT
Start: 2021-01-27 | End: 2021-01-29 | Stop reason: HOSPADM

## 2021-01-27 RX ADMIN — DIAZEPAM 10 MG: 5 TABLET ORAL at 05:01

## 2021-01-27 RX ADMIN — NAPROXEN 250 MG: 250 TABLET ORAL at 12:01

## 2021-01-27 RX ADMIN — LISINOPRIL 10 MG: 10 TABLET ORAL at 09:01

## 2021-01-27 RX ADMIN — LORAZEPAM 2 MG: 2 INJECTION INTRAMUSCULAR; INTRAVENOUS at 02:01

## 2021-01-27 RX ADMIN — DIAZEPAM 10 MG: 5 TABLET ORAL at 12:01

## 2021-01-27 RX ADMIN — NAPROXEN 250 MG: 250 TABLET ORAL at 05:01

## 2021-01-27 RX ADMIN — NAPROXEN 250 MG: 250 TABLET ORAL at 08:01

## 2021-01-27 RX ADMIN — DIAZEPAM 10 MG: 5 TABLET ORAL at 11:01

## 2021-01-27 RX ADMIN — Medication 100 MG: at 09:01

## 2021-01-27 RX ADMIN — ROSUVASTATIN CALCIUM 10 MG: 10 TABLET, COATED ORAL at 08:01

## 2021-01-27 RX ADMIN — THERA TABS 1 TABLET: TAB at 09:01

## 2021-01-28 PROBLEM — R53.1 WEAKNESS: Status: ACTIVE | Noted: 2021-01-28

## 2021-01-28 PROBLEM — R27.0 ATAXIA: Status: ACTIVE | Noted: 2021-01-28

## 2021-01-28 PROBLEM — F10.231 ALCOHOL DEPENDENCE WITH WITHDRAWAL DELIRIUM: Status: ACTIVE | Noted: 2021-01-25

## 2021-01-28 LAB
ALBUMIN SERPL BCP-MCNC: 2.9 G/DL (ref 3.5–5.2)
ALP SERPL-CCNC: 79 U/L (ref 55–135)
ALT SERPL W/O P-5'-P-CCNC: 34 U/L (ref 10–44)
ANION GAP SERPL CALC-SCNC: 13 MMOL/L (ref 8–16)
AST SERPL-CCNC: 37 U/L (ref 10–40)
BASOPHILS # BLD AUTO: 0.02 K/UL (ref 0–0.2)
BASOPHILS NFR BLD: 0.5 % (ref 0–1.9)
BILIRUB SERPL-MCNC: 1.5 MG/DL (ref 0.1–1)
BUN SERPL-MCNC: 10 MG/DL (ref 6–20)
CALCIUM SERPL-MCNC: 8.8 MG/DL (ref 8.7–10.5)
CHLORIDE SERPL-SCNC: 106 MMOL/L (ref 95–110)
CO2 SERPL-SCNC: 20 MMOL/L (ref 23–29)
CREAT SERPL-MCNC: 0.7 MG/DL (ref 0.5–1.4)
DIFFERENTIAL METHOD: ABNORMAL
EOSINOPHIL # BLD AUTO: 0 K/UL (ref 0–0.5)
EOSINOPHIL NFR BLD: 0.7 % (ref 0–8)
ERYTHROCYTE [DISTWIDTH] IN BLOOD BY AUTOMATED COUNT: 14.3 % (ref 11.5–14.5)
EST. GFR  (AFRICAN AMERICAN): >60 ML/MIN/1.73 M^2
EST. GFR  (NON AFRICAN AMERICAN): >60 ML/MIN/1.73 M^2
GLUCOSE SERPL-MCNC: 87 MG/DL (ref 70–110)
HCT VFR BLD AUTO: 34.1 % (ref 40–54)
HGB BLD-MCNC: 11 G/DL (ref 14–18)
IMM GRANULOCYTES # BLD AUTO: 0.01 K/UL (ref 0–0.04)
IMM GRANULOCYTES NFR BLD AUTO: 0.2 % (ref 0–0.5)
LYMPHOCYTES # BLD AUTO: 1 K/UL (ref 1–4.8)
LYMPHOCYTES NFR BLD: 22.4 % (ref 18–48)
MAGNESIUM SERPL-MCNC: 1.5 MG/DL (ref 1.6–2.6)
MCH RBC QN AUTO: 33 PG (ref 27–31)
MCHC RBC AUTO-ENTMCNC: 32.3 G/DL (ref 32–36)
MCV RBC AUTO: 102 FL (ref 82–98)
MONOCYTES # BLD AUTO: 0.5 K/UL (ref 0.3–1)
MONOCYTES NFR BLD: 11.2 % (ref 4–15)
NEUTROPHILS # BLD AUTO: 2.8 K/UL (ref 1.8–7.7)
NEUTROPHILS NFR BLD: 65 % (ref 38–73)
NRBC BLD-RTO: 0 /100 WBC
PHOSPHATE SERPL-MCNC: 3.9 MG/DL (ref 2.7–4.5)
PLATELET # BLD AUTO: 101 K/UL (ref 150–350)
PMV BLD AUTO: 10.6 FL (ref 9.2–12.9)
POCT GLUCOSE: 121 MG/DL (ref 70–110)
POCT GLUCOSE: 99 MG/DL (ref 70–110)
POTASSIUM SERPL-SCNC: 3.8 MMOL/L (ref 3.5–5.1)
PROT SERPL-MCNC: 6.8 G/DL (ref 6–8.4)
RBC # BLD AUTO: 3.33 M/UL (ref 4.6–6.2)
SODIUM SERPL-SCNC: 139 MMOL/L (ref 136–145)
WBC # BLD AUTO: 4.37 K/UL (ref 3.9–12.7)

## 2021-01-28 PROCEDURE — 25000003 PHARM REV CODE 250: Performed by: HOSPITALIST

## 2021-01-28 PROCEDURE — 25000003 PHARM REV CODE 250: Performed by: STUDENT IN AN ORGANIZED HEALTH CARE EDUCATION/TRAINING PROGRAM

## 2021-01-28 PROCEDURE — 85025 COMPLETE CBC W/AUTO DIFF WBC: CPT

## 2021-01-28 PROCEDURE — 84100 ASSAY OF PHOSPHORUS: CPT

## 2021-01-28 PROCEDURE — 63600175 PHARM REV CODE 636 W HCPCS: Performed by: HOSPITALIST

## 2021-01-28 PROCEDURE — 99232 SBSQ HOSP IP/OBS MODERATE 35: CPT | Mod: ,,, | Performed by: HOSPITALIST

## 2021-01-28 PROCEDURE — 83735 ASSAY OF MAGNESIUM: CPT

## 2021-01-28 PROCEDURE — 99232 PR SUBSEQUENT HOSPITAL CARE,LEVL II: ICD-10-PCS | Mod: ,,, | Performed by: HOSPITALIST

## 2021-01-28 PROCEDURE — 99232 PR SUBSEQUENT HOSPITAL CARE,LEVL II: ICD-10-PCS | Mod: ,,, | Performed by: PSYCHIATRY & NEUROLOGY

## 2021-01-28 PROCEDURE — 99232 SBSQ HOSP IP/OBS MODERATE 35: CPT | Mod: ,,, | Performed by: PSYCHIATRY & NEUROLOGY

## 2021-01-28 PROCEDURE — 20600001 HC STEP DOWN PRIVATE ROOM

## 2021-01-28 PROCEDURE — 36415 COLL VENOUS BLD VENIPUNCTURE: CPT

## 2021-01-28 PROCEDURE — 80053 COMPREHEN METABOLIC PANEL: CPT

## 2021-01-28 RX ORDER — DIAZEPAM 5 MG/1
10 TABLET ORAL EVERY 8 HOURS
Status: DISCONTINUED | OUTPATIENT
Start: 2021-01-28 | End: 2021-01-28

## 2021-01-28 RX ORDER — DIAZEPAM 5 MG/1
5 TABLET ORAL EVERY 8 HOURS
Status: DISCONTINUED | OUTPATIENT
Start: 2021-01-28 | End: 2021-01-29 | Stop reason: HOSPADM

## 2021-01-28 RX ORDER — LANOLIN ALCOHOL/MO/W.PET/CERES
100 CREAM (GRAM) TOPICAL DAILY
Qty: 30 TABLET | Refills: 2 | Status: SHIPPED | OUTPATIENT
Start: 2021-01-29 | End: 2021-11-17

## 2021-01-28 RX ORDER — MAGNESIUM SULFATE HEPTAHYDRATE 40 MG/ML
2 INJECTION, SOLUTION INTRAVENOUS ONCE
Status: COMPLETED | OUTPATIENT
Start: 2021-01-28 | End: 2021-01-28

## 2021-01-28 RX ORDER — MELOXICAM 7.5 MG/1
7.5 TABLET ORAL 2 TIMES DAILY WITH MEALS
Status: DISCONTINUED | OUTPATIENT
Start: 2021-01-28 | End: 2021-01-29 | Stop reason: HOSPADM

## 2021-01-28 RX ORDER — FOLIC ACID 1 MG/1
1 TABLET ORAL DAILY
Qty: 30 TABLET | Refills: 2 | Status: SHIPPED | OUTPATIENT
Start: 2021-01-28 | End: 2021-06-21

## 2021-01-28 RX ADMIN — MAGNESIUM SULFATE 2 G: 2 INJECTION INTRAVENOUS at 11:01

## 2021-01-28 RX ADMIN — NAPROXEN 250 MG: 250 TABLET ORAL at 06:01

## 2021-01-28 RX ADMIN — DIAZEPAM 5 MG: 5 TABLET ORAL at 09:01

## 2021-01-28 RX ADMIN — THERA TABS 1 TABLET: TAB at 08:01

## 2021-01-28 RX ADMIN — DIAZEPAM 5 MG: 5 TABLET ORAL at 02:01

## 2021-01-28 RX ADMIN — DIAZEPAM 10 MG: 5 TABLET ORAL at 06:01

## 2021-01-28 RX ADMIN — SODIUM CHLORIDE, SODIUM LACTATE, POTASSIUM CHLORIDE, AND CALCIUM CHLORIDE 1000 ML: .6; .31; .03; .02 INJECTION, SOLUTION INTRAVENOUS at 09:01

## 2021-01-28 RX ADMIN — MELOXICAM 7.5 MG: 7.5 TABLET ORAL at 08:01

## 2021-01-28 RX ADMIN — LISINOPRIL 10 MG: 10 TABLET ORAL at 08:01

## 2021-01-28 RX ADMIN — Medication 100 MG: at 08:01

## 2021-01-28 RX ADMIN — NAPROXEN 250 MG: 250 TABLET ORAL at 05:01

## 2021-01-28 RX ADMIN — ROSUVASTATIN CALCIUM 10 MG: 10 TABLET, COATED ORAL at 09:01

## 2021-01-28 RX ADMIN — MELOXICAM 7.5 MG: 7.5 TABLET ORAL at 05:01

## 2021-01-29 VITALS
HEART RATE: 90 BPM | OXYGEN SATURATION: 95 % | RESPIRATION RATE: 18 BRPM | TEMPERATURE: 99 F | WEIGHT: 193 LBS | BODY MASS INDEX: 28.58 KG/M2 | SYSTOLIC BLOOD PRESSURE: 140 MMHG | HEIGHT: 69 IN | DIASTOLIC BLOOD PRESSURE: 77 MMHG

## 2021-01-29 LAB
ALBUMIN SERPL BCP-MCNC: 2.9 G/DL (ref 3.5–5.2)
ALP SERPL-CCNC: 79 U/L (ref 55–135)
ALT SERPL W/O P-5'-P-CCNC: 29 U/L (ref 10–44)
ANION GAP SERPL CALC-SCNC: 11 MMOL/L (ref 8–16)
AST SERPL-CCNC: 31 U/L (ref 10–40)
BASOPHILS # BLD AUTO: 0.02 K/UL (ref 0–0.2)
BASOPHILS NFR BLD: 0.4 % (ref 0–1.9)
BILIRUB SERPL-MCNC: 1.2 MG/DL (ref 0.1–1)
BUN SERPL-MCNC: 10 MG/DL (ref 6–20)
CALCIUM SERPL-MCNC: 9 MG/DL (ref 8.7–10.5)
CHLORIDE SERPL-SCNC: 107 MMOL/L (ref 95–110)
CO2 SERPL-SCNC: 21 MMOL/L (ref 23–29)
CREAT SERPL-MCNC: 0.7 MG/DL (ref 0.5–1.4)
DIFFERENTIAL METHOD: ABNORMAL
EOSINOPHIL # BLD AUTO: 0.1 K/UL (ref 0–0.5)
EOSINOPHIL NFR BLD: 1.3 % (ref 0–8)
ERYTHROCYTE [DISTWIDTH] IN BLOOD BY AUTOMATED COUNT: 14.2 % (ref 11.5–14.5)
EST. GFR  (AFRICAN AMERICAN): >60 ML/MIN/1.73 M^2
EST. GFR  (NON AFRICAN AMERICAN): >60 ML/MIN/1.73 M^2
GLUCOSE SERPL-MCNC: 103 MG/DL (ref 70–110)
HCT VFR BLD AUTO: 34.4 % (ref 40–54)
HGB BLD-MCNC: 11 G/DL (ref 14–18)
IMM GRANULOCYTES # BLD AUTO: 0.01 K/UL (ref 0–0.04)
IMM GRANULOCYTES NFR BLD AUTO: 0.2 % (ref 0–0.5)
LYMPHOCYTES # BLD AUTO: 1.1 K/UL (ref 1–4.8)
LYMPHOCYTES NFR BLD: 23.3 % (ref 18–48)
MAGNESIUM SERPL-MCNC: 1.5 MG/DL (ref 1.6–2.6)
MCH RBC QN AUTO: 32.5 PG (ref 27–31)
MCHC RBC AUTO-ENTMCNC: 32 G/DL (ref 32–36)
MCV RBC AUTO: 102 FL (ref 82–98)
MONOCYTES # BLD AUTO: 0.7 K/UL (ref 0.3–1)
MONOCYTES NFR BLD: 13.8 % (ref 4–15)
NEUTROPHILS # BLD AUTO: 2.9 K/UL (ref 1.8–7.7)
NEUTROPHILS NFR BLD: 61 % (ref 38–73)
NRBC BLD-RTO: 0 /100 WBC
PHOSPHATE SERPL-MCNC: 4 MG/DL (ref 2.7–4.5)
PLATELET # BLD AUTO: 117 K/UL (ref 150–350)
PMV BLD AUTO: 10.5 FL (ref 9.2–12.9)
POCT GLUCOSE: 129 MG/DL (ref 70–110)
POCT GLUCOSE: 94 MG/DL (ref 70–110)
POCT GLUCOSE: 99 MG/DL (ref 70–110)
POTASSIUM SERPL-SCNC: 3.8 MMOL/L (ref 3.5–5.1)
PROT SERPL-MCNC: 6.9 G/DL (ref 6–8.4)
RBC # BLD AUTO: 3.38 M/UL (ref 4.6–6.2)
SODIUM SERPL-SCNC: 139 MMOL/L (ref 136–145)
WBC # BLD AUTO: 4.77 K/UL (ref 3.9–12.7)

## 2021-01-29 PROCEDURE — 25000003 PHARM REV CODE 250: Performed by: STUDENT IN AN ORGANIZED HEALTH CARE EDUCATION/TRAINING PROGRAM

## 2021-01-29 PROCEDURE — 97530 THERAPEUTIC ACTIVITIES: CPT

## 2021-01-29 PROCEDURE — 84100 ASSAY OF PHOSPHORUS: CPT

## 2021-01-29 PROCEDURE — 99232 SBSQ HOSP IP/OBS MODERATE 35: CPT | Mod: ,,, | Performed by: PSYCHIATRY & NEUROLOGY

## 2021-01-29 PROCEDURE — 83735 ASSAY OF MAGNESIUM: CPT

## 2021-01-29 PROCEDURE — 85025 COMPLETE CBC W/AUTO DIFF WBC: CPT

## 2021-01-29 PROCEDURE — 99239 HOSP IP/OBS DSCHRG MGMT >30: CPT | Mod: ,,, | Performed by: HOSPITALIST

## 2021-01-29 PROCEDURE — 99239 PR HOSPITAL DISCHARGE DAY,>30 MIN: ICD-10-PCS | Mod: ,,, | Performed by: HOSPITALIST

## 2021-01-29 PROCEDURE — 99232 PR SUBSEQUENT HOSPITAL CARE,LEVL II: ICD-10-PCS | Mod: ,,, | Performed by: PSYCHIATRY & NEUROLOGY

## 2021-01-29 PROCEDURE — 80053 COMPREHEN METABOLIC PANEL: CPT

## 2021-01-29 PROCEDURE — 25000003 PHARM REV CODE 250: Performed by: HOSPITALIST

## 2021-01-29 PROCEDURE — 36415 COLL VENOUS BLD VENIPUNCTURE: CPT

## 2021-01-29 PROCEDURE — 97162 PT EVAL MOD COMPLEX 30 MIN: CPT

## 2021-01-29 RX ADMIN — THERA TABS 1 TABLET: TAB at 09:01

## 2021-01-29 RX ADMIN — MELOXICAM 7.5 MG: 7.5 TABLET ORAL at 09:01

## 2021-01-29 RX ADMIN — Medication 100 MG: at 09:01

## 2021-01-29 RX ADMIN — DIAZEPAM 5 MG: 5 TABLET ORAL at 05:01

## 2021-01-29 RX ADMIN — LISINOPRIL 10 MG: 10 TABLET ORAL at 09:01

## 2021-01-29 RX ADMIN — NAPROXEN 250 MG: 250 TABLET ORAL at 05:01

## 2021-08-23 ENCOUNTER — LAB VISIT (OUTPATIENT)
Dept: LAB | Facility: HOSPITAL | Age: 51
End: 2021-08-23
Attending: INTERNAL MEDICINE
Payer: COMMERCIAL

## 2021-08-23 DIAGNOSIS — Z12.5 PROSTATE CANCER SCREENING: ICD-10-CM

## 2021-08-23 DIAGNOSIS — E11.42 TYPE 2 DIABETES MELLITUS WITH POLYNEUROPATHY: ICD-10-CM

## 2021-08-23 LAB
ALBUMIN SERPL BCP-MCNC: 3.8 G/DL (ref 3.5–5.2)
ALP SERPL-CCNC: 82 U/L (ref 55–135)
ALT SERPL W/O P-5'-P-CCNC: 44 U/L (ref 10–44)
ANION GAP SERPL CALC-SCNC: 11 MMOL/L (ref 8–16)
AST SERPL-CCNC: 72 U/L (ref 10–40)
BASOPHILS # BLD AUTO: 0.07 K/UL (ref 0–0.2)
BASOPHILS NFR BLD: 0.7 % (ref 0–1.9)
BILIRUB SERPL-MCNC: 1.3 MG/DL (ref 0.1–1)
BUN SERPL-MCNC: 8 MG/DL (ref 6–20)
CALCIUM SERPL-MCNC: 9.5 MG/DL (ref 8.7–10.5)
CHLORIDE SERPL-SCNC: 104 MMOL/L (ref 95–110)
CHOLEST SERPL-MCNC: 132 MG/DL (ref 120–199)
CHOLEST/HDLC SERPL: 2.4 {RATIO} (ref 2–5)
CO2 SERPL-SCNC: 20 MMOL/L (ref 23–29)
COMPLEXED PSA SERPL-MCNC: 0.83 NG/ML (ref 0–4)
CREAT SERPL-MCNC: 0.8 MG/DL (ref 0.5–1.4)
DIFFERENTIAL METHOD: ABNORMAL
EOSINOPHIL # BLD AUTO: 0.3 K/UL (ref 0–0.5)
EOSINOPHIL NFR BLD: 2.9 % (ref 0–8)
ERYTHROCYTE [DISTWIDTH] IN BLOOD BY AUTOMATED COUNT: 12.8 % (ref 11.5–14.5)
EST. GFR  (AFRICAN AMERICAN): >60 ML/MIN/1.73 M^2
EST. GFR  (NON AFRICAN AMERICAN): >60 ML/MIN/1.73 M^2
ESTIMATED AVG GLUCOSE: 108 MG/DL (ref 68–131)
GLUCOSE SERPL-MCNC: 107 MG/DL (ref 70–110)
HBA1C MFR BLD: 5.4 % (ref 4–5.6)
HCT VFR BLD AUTO: 42.2 % (ref 40–54)
HDLC SERPL-MCNC: 54 MG/DL (ref 40–75)
HDLC SERPL: 40.9 % (ref 20–50)
HGB BLD-MCNC: 14.4 G/DL (ref 14–18)
IMM GRANULOCYTES # BLD AUTO: 0.05 K/UL (ref 0–0.04)
IMM GRANULOCYTES NFR BLD AUTO: 0.5 % (ref 0–0.5)
LDLC SERPL CALC-MCNC: 61.2 MG/DL (ref 63–159)
LYMPHOCYTES # BLD AUTO: 1.6 K/UL (ref 1–4.8)
LYMPHOCYTES NFR BLD: 16.9 % (ref 18–48)
MCH RBC QN AUTO: 31.9 PG (ref 27–31)
MCHC RBC AUTO-ENTMCNC: 34.1 G/DL (ref 32–36)
MCV RBC AUTO: 93 FL (ref 82–98)
MONOCYTES # BLD AUTO: 0.6 K/UL (ref 0.3–1)
MONOCYTES NFR BLD: 6.5 % (ref 4–15)
NEUTROPHILS # BLD AUTO: 6.8 K/UL (ref 1.8–7.7)
NEUTROPHILS NFR BLD: 72.5 % (ref 38–73)
NONHDLC SERPL-MCNC: 78 MG/DL
NRBC BLD-RTO: 0 /100 WBC
PLATELET # BLD AUTO: 238 K/UL (ref 150–450)
PMV BLD AUTO: 11.1 FL (ref 9.2–12.9)
POTASSIUM SERPL-SCNC: 4 MMOL/L (ref 3.5–5.1)
PROT SERPL-MCNC: 7.9 G/DL (ref 6–8.4)
RBC # BLD AUTO: 4.52 M/UL (ref 4.6–6.2)
SODIUM SERPL-SCNC: 135 MMOL/L (ref 136–145)
TRIGL SERPL-MCNC: 84 MG/DL (ref 30–150)
WBC # BLD AUTO: 9.44 K/UL (ref 3.9–12.7)

## 2021-08-23 PROCEDURE — 84153 ASSAY OF PSA TOTAL: CPT | Performed by: INTERNAL MEDICINE

## 2021-08-23 PROCEDURE — 83036 HEMOGLOBIN GLYCOSYLATED A1C: CPT | Performed by: INTERNAL MEDICINE

## 2021-08-23 PROCEDURE — 85025 COMPLETE CBC W/AUTO DIFF WBC: CPT | Performed by: INTERNAL MEDICINE

## 2021-08-23 PROCEDURE — 80053 COMPREHEN METABOLIC PANEL: CPT | Performed by: INTERNAL MEDICINE

## 2021-08-23 PROCEDURE — 80061 LIPID PANEL: CPT | Performed by: INTERNAL MEDICINE

## 2021-08-23 PROCEDURE — 36415 COLL VENOUS BLD VENIPUNCTURE: CPT | Performed by: INTERNAL MEDICINE

## 2022-05-24 ENCOUNTER — LAB VISIT (OUTPATIENT)
Dept: LAB | Facility: HOSPITAL | Age: 52
End: 2022-05-24
Attending: INTERNAL MEDICINE
Payer: COMMERCIAL

## 2022-05-24 DIAGNOSIS — E11.42 TYPE 2 DIABETES MELLITUS WITH POLYNEUROPATHY: ICD-10-CM

## 2022-05-24 LAB
ALBUMIN SERPL BCP-MCNC: 2.8 G/DL (ref 3.5–5.2)
ALP SERPL-CCNC: 123 U/L (ref 55–135)
ALT SERPL W/O P-5'-P-CCNC: 65 U/L (ref 10–44)
ANION GAP SERPL CALC-SCNC: 10 MMOL/L (ref 8–16)
AST SERPL-CCNC: 120 U/L (ref 10–40)
BASOPHILS # BLD AUTO: 0.07 K/UL (ref 0–0.2)
BASOPHILS NFR BLD: 1.3 % (ref 0–1.9)
BILIRUB SERPL-MCNC: 0.6 MG/DL (ref 0.1–1)
BUN SERPL-MCNC: 8 MG/DL (ref 6–20)
CALCIUM SERPL-MCNC: 8.9 MG/DL (ref 8.7–10.5)
CHLORIDE SERPL-SCNC: 107 MMOL/L (ref 95–110)
CHOLEST SERPL-MCNC: 179 MG/DL (ref 120–199)
CHOLEST/HDLC SERPL: 2.6 {RATIO} (ref 2–5)
CO2 SERPL-SCNC: 26 MMOL/L (ref 23–29)
CREAT SERPL-MCNC: 0.7 MG/DL (ref 0.5–1.4)
DIFFERENTIAL METHOD: ABNORMAL
EOSINOPHIL # BLD AUTO: 0.1 K/UL (ref 0–0.5)
EOSINOPHIL NFR BLD: 1.3 % (ref 0–8)
ERYTHROCYTE [DISTWIDTH] IN BLOOD BY AUTOMATED COUNT: 18.8 % (ref 11.5–14.5)
EST. GFR  (AFRICAN AMERICAN): >60 ML/MIN/1.73 M^2
EST. GFR  (NON AFRICAN AMERICAN): >60 ML/MIN/1.73 M^2
ESTIMATED AVG GLUCOSE: 137 MG/DL (ref 68–131)
GLUCOSE SERPL-MCNC: 145 MG/DL (ref 70–110)
HBA1C MFR BLD: 6.4 % (ref 4–5.6)
HCT VFR BLD AUTO: 33.9 % (ref 40–54)
HDLC SERPL-MCNC: 68 MG/DL (ref 40–75)
HDLC SERPL: 38 % (ref 20–50)
HGB BLD-MCNC: 10.1 G/DL (ref 14–18)
IMM GRANULOCYTES # BLD AUTO: 0.03 K/UL (ref 0–0.04)
IMM GRANULOCYTES NFR BLD AUTO: 0.6 % (ref 0–0.5)
LDLC SERPL CALC-MCNC: 90.4 MG/DL (ref 63–159)
LYMPHOCYTES # BLD AUTO: 1 K/UL (ref 1–4.8)
LYMPHOCYTES NFR BLD: 17.8 % (ref 18–48)
MCH RBC QN AUTO: 25.8 PG (ref 27–31)
MCHC RBC AUTO-ENTMCNC: 29.8 G/DL (ref 32–36)
MCV RBC AUTO: 87 FL (ref 82–98)
MONOCYTES # BLD AUTO: 0.6 K/UL (ref 0.3–1)
MONOCYTES NFR BLD: 11.2 % (ref 4–15)
NEUTROPHILS # BLD AUTO: 3.7 K/UL (ref 1.8–7.7)
NEUTROPHILS NFR BLD: 67.8 % (ref 38–73)
NONHDLC SERPL-MCNC: 111 MG/DL
NRBC BLD-RTO: 0 /100 WBC
PLATELET # BLD AUTO: 161 K/UL (ref 150–450)
PMV BLD AUTO: 10.6 FL (ref 9.2–12.9)
POTASSIUM SERPL-SCNC: 3.9 MMOL/L (ref 3.5–5.1)
PROT SERPL-MCNC: 6.3 G/DL (ref 6–8.4)
RBC # BLD AUTO: 3.91 M/UL (ref 4.6–6.2)
SODIUM SERPL-SCNC: 143 MMOL/L (ref 136–145)
TRIGL SERPL-MCNC: 103 MG/DL (ref 30–150)
WBC # BLD AUTO: 5.45 K/UL (ref 3.9–12.7)

## 2022-05-24 PROCEDURE — 83036 HEMOGLOBIN GLYCOSYLATED A1C: CPT | Performed by: INTERNAL MEDICINE

## 2022-05-24 PROCEDURE — 80061 LIPID PANEL: CPT | Performed by: INTERNAL MEDICINE

## 2022-05-24 PROCEDURE — 85025 COMPLETE CBC W/AUTO DIFF WBC: CPT | Performed by: INTERNAL MEDICINE

## 2022-05-24 PROCEDURE — 36415 COLL VENOUS BLD VENIPUNCTURE: CPT | Performed by: INTERNAL MEDICINE

## 2022-05-24 PROCEDURE — 80053 COMPREHEN METABOLIC PANEL: CPT | Performed by: INTERNAL MEDICINE

## 2022-08-17 ENCOUNTER — OFFICE VISIT (OUTPATIENT)
Dept: OTOLARYNGOLOGY | Facility: CLINIC | Age: 52
End: 2022-08-17
Payer: COMMERCIAL

## 2022-08-17 ENCOUNTER — TELEPHONE (OUTPATIENT)
Dept: OTOLARYNGOLOGY | Facility: CLINIC | Age: 52
End: 2022-08-17

## 2022-08-17 VITALS
BODY MASS INDEX: 27.46 KG/M2 | HEART RATE: 117 BPM | SYSTOLIC BLOOD PRESSURE: 150 MMHG | DIASTOLIC BLOOD PRESSURE: 101 MMHG | WEIGHT: 185.94 LBS

## 2022-08-17 DIAGNOSIS — H60.313 CHRONIC DIFFUSE OTITIS EXTERNA OF BOTH EARS: Primary | ICD-10-CM

## 2022-08-17 PROCEDURE — 99203 PR OFFICE/OUTPT VISIT, NEW, LEVL III, 30-44 MIN: ICD-10-PCS | Mod: S$GLB,,, | Performed by: OTOLARYNGOLOGY

## 2022-08-17 PROCEDURE — 3080F DIAST BP >= 90 MM HG: CPT | Mod: CPTII,S$GLB,, | Performed by: OTOLARYNGOLOGY

## 2022-08-17 PROCEDURE — 99999 PR PBB SHADOW E&M-EST. PATIENT-LVL III: CPT | Mod: PBBFAC,,, | Performed by: OTOLARYNGOLOGY

## 2022-08-17 PROCEDURE — 3044F PR MOST RECENT HEMOGLOBIN A1C LEVEL <7.0%: ICD-10-PCS | Mod: CPTII,S$GLB,, | Performed by: OTOLARYNGOLOGY

## 2022-08-17 PROCEDURE — 3044F HG A1C LEVEL LT 7.0%: CPT | Mod: CPTII,S$GLB,, | Performed by: OTOLARYNGOLOGY

## 2022-08-17 PROCEDURE — 3077F SYST BP >= 140 MM HG: CPT | Mod: CPTII,S$GLB,, | Performed by: OTOLARYNGOLOGY

## 2022-08-17 PROCEDURE — 1160F RVW MEDS BY RX/DR IN RCRD: CPT | Mod: CPTII,S$GLB,, | Performed by: OTOLARYNGOLOGY

## 2022-08-17 PROCEDURE — 3077F PR MOST RECENT SYSTOLIC BLOOD PRESSURE >= 140 MM HG: ICD-10-PCS | Mod: CPTII,S$GLB,, | Performed by: OTOLARYNGOLOGY

## 2022-08-17 PROCEDURE — 99203 OFFICE O/P NEW LOW 30 MIN: CPT | Mod: S$GLB,,, | Performed by: OTOLARYNGOLOGY

## 2022-08-17 PROCEDURE — 4010F ACE/ARB THERAPY RXD/TAKEN: CPT | Mod: CPTII,S$GLB,, | Performed by: OTOLARYNGOLOGY

## 2022-08-17 PROCEDURE — 1160F PR REVIEW ALL MEDS BY PRESCRIBER/CLIN PHARMACIST DOCUMENTED: ICD-10-PCS | Mod: CPTII,S$GLB,, | Performed by: OTOLARYNGOLOGY

## 2022-08-17 PROCEDURE — 3008F PR BODY MASS INDEX (BMI) DOCUMENTED: ICD-10-PCS | Mod: CPTII,S$GLB,, | Performed by: OTOLARYNGOLOGY

## 2022-08-17 PROCEDURE — 1159F PR MEDICATION LIST DOCUMENTED IN MEDICAL RECORD: ICD-10-PCS | Mod: CPTII,S$GLB,, | Performed by: OTOLARYNGOLOGY

## 2022-08-17 PROCEDURE — 1159F MED LIST DOCD IN RCRD: CPT | Mod: CPTII,S$GLB,, | Performed by: OTOLARYNGOLOGY

## 2022-08-17 PROCEDURE — 3080F PR MOST RECENT DIASTOLIC BLOOD PRESSURE >= 90 MM HG: ICD-10-PCS | Mod: CPTII,S$GLB,, | Performed by: OTOLARYNGOLOGY

## 2022-08-17 PROCEDURE — 99999 PR PBB SHADOW E&M-EST. PATIENT-LVL III: ICD-10-PCS | Mod: PBBFAC,,, | Performed by: OTOLARYNGOLOGY

## 2022-08-17 PROCEDURE — 4010F PR ACE/ARB THEARPY RXD/TAKEN: ICD-10-PCS | Mod: CPTII,S$GLB,, | Performed by: OTOLARYNGOLOGY

## 2022-08-17 PROCEDURE — 3008F BODY MASS INDEX DOCD: CPT | Mod: CPTII,S$GLB,, | Performed by: OTOLARYNGOLOGY

## 2022-08-17 RX ORDER — CIPROFLOXACIN 500 MG/1
500 TABLET ORAL 2 TIMES DAILY
Qty: 28 TABLET | Refills: 0 | Status: SHIPPED | OUTPATIENT
Start: 2022-08-17 | End: 2022-08-31

## 2022-08-17 RX ORDER — SULFAMETHOXAZOLE AND TRIMETHOPRIM 800; 160 MG/1; MG/1
1 TABLET ORAL 2 TIMES DAILY
Qty: 20 TABLET | Refills: 0 | Status: SHIPPED | OUTPATIENT
Start: 2022-08-17 | End: 2022-08-17

## 2022-08-17 RX ORDER — NYSTATIN AND TRIAMCINOLONE ACETONIDE 100000; 1 [USP'U]/G; MG/G
OINTMENT TOPICAL 2 TIMES DAILY
Qty: 15 G | Refills: 0 | Status: SHIPPED | OUTPATIENT
Start: 2022-08-17 | End: 2023-05-04 | Stop reason: SDUPTHER

## 2022-08-17 NOTE — PROGRESS NOTES
Chief Complaint   Patient presents with    Otalgia     Bilateral Swollen / Pain    .     HPI: Abe Cook is 52 y.o. male who is self-referred for evaluation of bilateral ear pain.  Symptoms include bilateral ear swelling. He reports that he has been noticing swelling of both ears for a few months.  He has not been on any medications for this recently.  He denies otorrhea.  He does feel that the right ear canal is swollen and he feels his hearing is decreased.   He denies history of any ear surgeries.  He has had recent surgery on his right index finger on 8/15 with [facility] for biopsy of finger. He says he believes it was due to a few month history of swelling in his finger for a few months.     Past Medical History:   Diagnosis Date    Diabetes mellitus      Social History     Socioeconomic History    Marital status:    Tobacco Use    Smoking status: Never Smoker    Smokeless tobacco: Never Used    Tobacco comment: 2 cigs today    Substance and Sexual Activity    Alcohol use: Yes     Comment: daily alcohol, 1-2 pints per day,.    Drug use: No     Past Surgical History:   Procedure Laterality Date    BACK SURGERY      KNEE SURGERY       No family history on file.        Review of Systems  General: negative for chills, fever or weight loss  Psychological: negative for mood changes or depression  Ophthalmic: negative for blurry vision, photophobia or eye pain  ENT: see HPI  Respiratory: no cough, shortness of breath, or wheezing  Cardiovascular: no chest pain or dyspnea on exertion  Gastrointestinal: no abdominal pain, change in bowel habits, or black/ bloody stools  Musculoskeletal: negative for gait disturbance or muscular weakness  Neurological: no syncope or seizures; no ataxia  Dermatological: negative for puritis,  rash and jaundice  Hematologic/lymphatic: no easy bruising, no new lumps or bumps      Physical Exam:    Vitals:    08/17/22 1028   BP: (!) 150/101   Pulse:  (!) 117       Constitutional: Well appearing / communicating without difficutly.  NAD.  Eyes: EOM I Bilaterally  Head/Face: Normocephalic.  Negative paranasal sinus pressure/tenderness.  Salivary glands WNL.  House Brackmann I Bilaterally.    Right Ear: Auricle normal appearance. Auricle with diffuse external edema and erythema of the entire pinna. Tender upon manipulation,TM w/o masses/lesions/perforations. TM mobility noted.   Left Ear: Auricle with diffuse external edema and erythema of the entire pinna.   External Auditory Canal within normal limits no lesions or masses,tender upon manipulation; TM w/o masses/lesions/perforations. TM mobility noted.  Nose: No gross nasal septal deviation. Inferior Turbinates 3+ bilaterally. No septal perforation. No masses/lesions. External nasal skin appears normal without masses/lesions.  Oral Cavity: Gingiva/lips within normal limits.  Dentition/gingiva healthy appearing. Mucus membranes moist. Floor of mouth soft, no masses palpated. Oral Tongue mobile. Hard Palate appears normal.    Oropharynx: Base of tongue appears normal. No masses/lesions noted. Tonsillar fossa/pharyngeal wall without lesions. Posterior oropharynx WNL.  Soft palate without masses. Midline uvula.   Neck/Lymphatic: No LAD I-VI bilaterally.  No thyromegaly.  No masses noted on exam.        Assessment:    ICD-10-CM ICD-9-CM    1. Chronic diffuse otitis externa of both ears  H60.313 380.23      The encounter diagnosis was Chronic diffuse otitis externa of both ears.      Plan:  No orders of the defined types were placed in this encounter.    Start Cipro 500mg 1 PO BID for 10 days  Start mycolog ointment to bilateral ears BID  Ideally would add systemic steroids for edema; but will hold due to recent hand surgery.      Apple Estrada MD

## 2022-08-17 NOTE — TELEPHONE ENCOUNTER
Called patient to inform that Dr. Marte has sent in a different antibiotics for him to start to his pharmacy. Tawanna verbalized understanding and thanked me for calling.

## 2022-09-13 ENCOUNTER — OFFICE VISIT (OUTPATIENT)
Dept: OTOLARYNGOLOGY | Facility: CLINIC | Age: 52
End: 2022-09-13
Payer: COMMERCIAL

## 2022-09-13 VITALS
WEIGHT: 183.31 LBS | HEIGHT: 69 IN | SYSTOLIC BLOOD PRESSURE: 111 MMHG | HEART RATE: 80 BPM | DIASTOLIC BLOOD PRESSURE: 76 MMHG | TEMPERATURE: 98 F | BODY MASS INDEX: 27.15 KG/M2

## 2022-09-13 DIAGNOSIS — H61.21 IMPACTED CERUMEN OF RIGHT EAR: ICD-10-CM

## 2022-09-13 DIAGNOSIS — H60.313 CHRONIC DIFFUSE OTITIS EXTERNA OF BOTH EARS: Primary | ICD-10-CM

## 2022-09-13 PROCEDURE — 1160F PR REVIEW ALL MEDS BY PRESCRIBER/CLIN PHARMACIST DOCUMENTED: ICD-10-PCS | Mod: CPTII,S$GLB,, | Performed by: OTOLARYNGOLOGY

## 2022-09-13 PROCEDURE — 3008F BODY MASS INDEX DOCD: CPT | Mod: CPTII,S$GLB,, | Performed by: OTOLARYNGOLOGY

## 2022-09-13 PROCEDURE — 1160F RVW MEDS BY RX/DR IN RCRD: CPT | Mod: CPTII,S$GLB,, | Performed by: OTOLARYNGOLOGY

## 2022-09-13 PROCEDURE — 99213 PR OFFICE/OUTPT VISIT, EST, LEVL III, 20-29 MIN: ICD-10-PCS | Mod: S$GLB,,, | Performed by: OTOLARYNGOLOGY

## 2022-09-13 PROCEDURE — 99999 PR PBB SHADOW E&M-EST. PATIENT-LVL IV: CPT | Mod: PBBFAC,,, | Performed by: OTOLARYNGOLOGY

## 2022-09-13 PROCEDURE — 99213 OFFICE O/P EST LOW 20 MIN: CPT | Mod: S$GLB,,, | Performed by: OTOLARYNGOLOGY

## 2022-09-13 PROCEDURE — 3044F HG A1C LEVEL LT 7.0%: CPT | Mod: CPTII,S$GLB,, | Performed by: OTOLARYNGOLOGY

## 2022-09-13 PROCEDURE — 3078F PR MOST RECENT DIASTOLIC BLOOD PRESSURE < 80 MM HG: ICD-10-PCS | Mod: CPTII,S$GLB,, | Performed by: OTOLARYNGOLOGY

## 2022-09-13 PROCEDURE — 3074F PR MOST RECENT SYSTOLIC BLOOD PRESSURE < 130 MM HG: ICD-10-PCS | Mod: CPTII,S$GLB,, | Performed by: OTOLARYNGOLOGY

## 2022-09-13 PROCEDURE — 4010F ACE/ARB THERAPY RXD/TAKEN: CPT | Mod: CPTII,S$GLB,, | Performed by: OTOLARYNGOLOGY

## 2022-09-13 PROCEDURE — 1159F PR MEDICATION LIST DOCUMENTED IN MEDICAL RECORD: ICD-10-PCS | Mod: CPTII,S$GLB,, | Performed by: OTOLARYNGOLOGY

## 2022-09-13 PROCEDURE — 3044F PR MOST RECENT HEMOGLOBIN A1C LEVEL <7.0%: ICD-10-PCS | Mod: CPTII,S$GLB,, | Performed by: OTOLARYNGOLOGY

## 2022-09-13 PROCEDURE — 1159F MED LIST DOCD IN RCRD: CPT | Mod: CPTII,S$GLB,, | Performed by: OTOLARYNGOLOGY

## 2022-09-13 PROCEDURE — 3078F DIAST BP <80 MM HG: CPT | Mod: CPTII,S$GLB,, | Performed by: OTOLARYNGOLOGY

## 2022-09-13 PROCEDURE — 4010F PR ACE/ARB THEARPY RXD/TAKEN: ICD-10-PCS | Mod: CPTII,S$GLB,, | Performed by: OTOLARYNGOLOGY

## 2022-09-13 PROCEDURE — 3008F PR BODY MASS INDEX (BMI) DOCUMENTED: ICD-10-PCS | Mod: CPTII,S$GLB,, | Performed by: OTOLARYNGOLOGY

## 2022-09-13 PROCEDURE — 99999 PR PBB SHADOW E&M-EST. PATIENT-LVL IV: ICD-10-PCS | Mod: PBBFAC,,, | Performed by: OTOLARYNGOLOGY

## 2022-09-13 PROCEDURE — 3074F SYST BP LT 130 MM HG: CPT | Mod: CPTII,S$GLB,, | Performed by: OTOLARYNGOLOGY

## 2022-09-13 NOTE — PROGRESS NOTES
Chief Complaint   Patient presents with    Follow-up     No more pain, tender to touch, inner ear feels swollen bilaterally    .     HPI: Abe Cook is 52 y.o. male who is self-referred for evaluation of bilateral ear pain.  Symptoms include bilateral ear swelling. He reports that he has been noticing swelling of both ears for a few months.  He has not been on any medications for this recently.  He denies otorrhea.  He does feel that the right ear canal is swollen and he feels his hearing is decreased.   He denies history of any ear surgeries.  He has had recent surgery on his right index finger on 8/15 with [facility] for biopsy of finger. He says he believes it was due to a few month history of swelling in his finger for a few months.     Interval HPI 9/13/2022:  Follow up visit. Reports that pain is improved. Ears remain somewhat tender to touch. Feels inside ear canal is swollen. Swelling of pinna improved. He reports that he had bacterial infection in his hand and that he has been referred to ID and will be starting what sounds like IV antibiotics via PICC line.     Past Medical History:   Diagnosis Date    Diabetes mellitus      Social History     Socioeconomic History    Marital status:    Tobacco Use    Smoking status: Never    Smokeless tobacco: Never    Tobacco comments:     2 cigs today    Substance and Sexual Activity    Alcohol use: Yes     Comment: daily alcohol, 1-2 pints per day,.    Drug use: No     Past Surgical History:   Procedure Laterality Date    BACK SURGERY      KNEE SURGERY       No family history on file.        Review of Systems  General: negative for chills, fever or weight loss  Psychological: negative for mood changes or depression  Ophthalmic: negative for blurry vision, photophobia or eye pain  ENT: see HPI  Respiratory: no cough, shortness of breath, or wheezing  Cardiovascular: no chest pain or dyspnea on exertion  Gastrointestinal: no abdominal pain,  change in bowel habits, or black/ bloody stools  Musculoskeletal: negative for gait disturbance or muscular weakness  Neurological: no syncope or seizures; no ataxia  Dermatological: negative for puritis,  rash and jaundice  Hematologic/lymphatic: no easy bruising, no new lumps or bumps      Physical Exam:    Vitals:    09/13/22 1317   BP: 111/76   Pulse: 80   Temp: 98.3 °F (36.8 °C)       Constitutional: Well appearing / communicating without difficutly.  NAD.  Eyes: EOM I Bilaterally  Head/Face: Normocephalic.  Negative paranasal sinus pressure/tenderness.  Salivary glands WNL.  House Brackmann I Bilaterally.    Right Ear: Auricle normal appearance. EAC- cerumen removed under microscopy. TM w/o masses/lesions/perforations. TM mobility noted.   Left Ear: Auricle normal appearance.   External Auditory Canal within normal limits no lesions or masses,tender upon manipulation; TM w/o masses/lesions/perforations. TM mobility noted.  Nose: No gross nasal septal deviation. Inferior Turbinates 3+ bilaterally. No septal perforation. No masses/lesions. External nasal skin appears normal without masses/lesions.  Oral Cavity: Gingiva/lips within normal limits.  Dentition/gingiva healthy appearing. Mucus membranes moist. Floor of mouth soft, no masses palpated. Oral Tongue mobile. Hard Palate appears normal.    Oropharynx: Base of tongue appears normal. No masses/lesions noted. Tonsillar fossa/pharyngeal wall without lesions. Posterior oropharynx WNL.  Soft palate without masses. Midline uvula.   Neck/Lymphatic: No LAD I-VI bilaterally.  No thyromegaly.  No masses noted on exam.        Assessment:    ICD-10-CM ICD-9-CM    1. Chronic diffuse otitis externa of both ears  H60.313 380.23       2. Impacted cerumen of right ear  H61.21 380.4           The primary encounter diagnosis was Chronic diffuse otitis externa of both ears. A diagnosis of Impacted cerumen of right ear was also pertinent to this visit.      Plan:  No orders of  the defined types were placed in this encounter.    Overall improved.  Cerumen removed today. Advised to avoid the use of q-tips and that she may use an OTC removal aid such as Debrox.         Apple Estrada MD

## 2022-09-13 NOTE — PROCEDURES
Procedures    Procedure Note    DIAGNOSIS: Right Cerumen Impaction    Description:  The patient was seated in an exam chair.  An ear speculum was placed in the right EAC and was examined under the microscope.  Suction and/or loop curettes were used to remove a large cerumen impaction.  The tympanic membrane was visualized and was normal in appearance.  The patient tolerated the procedure well without complications.

## 2022-11-21 ENCOUNTER — LAB VISIT (OUTPATIENT)
Dept: LAB | Facility: HOSPITAL | Age: 52
End: 2022-11-21
Attending: INTERNAL MEDICINE
Payer: COMMERCIAL

## 2022-11-21 DIAGNOSIS — R39.198 ABNORMAL URINATION: ICD-10-CM

## 2022-11-21 LAB
BACTERIA #/AREA URNS HPF: ABNORMAL /HPF
BILIRUB UR QL STRIP: NEGATIVE
CLARITY UR: ABNORMAL
COLOR UR: YELLOW
GLUCOSE UR QL STRIP: NEGATIVE
HGB UR QL STRIP: ABNORMAL
HYALINE CASTS #/AREA URNS LPF: 12 /LPF
KETONES UR QL STRIP: NEGATIVE
LEUKOCYTE ESTERASE UR QL STRIP: NEGATIVE
MICROSCOPIC COMMENT: ABNORMAL
NITRITE UR QL STRIP: NEGATIVE
PH UR STRIP: 6 [PH] (ref 5–8)
PROT UR QL STRIP: ABNORMAL
RBC #/AREA URNS HPF: 2 /HPF (ref 0–4)
SP GR UR STRIP: 1.03 (ref 1–1.03)
SQUAMOUS #/AREA URNS HPF: 1 /HPF
URN SPEC COLLECT METH UR: ABNORMAL
UROBILINOGEN UR STRIP-ACNC: ABNORMAL EU/DL
WBC #/AREA URNS HPF: 14 /HPF (ref 0–5)

## 2022-11-21 PROCEDURE — 81000 URINALYSIS NONAUTO W/SCOPE: CPT | Performed by: INTERNAL MEDICINE

## 2022-11-21 PROCEDURE — 87086 URINE CULTURE/COLONY COUNT: CPT | Performed by: INTERNAL MEDICINE

## 2022-11-23 LAB — BACTERIA UR CULT: NO GROWTH

## 2022-12-02 ENCOUNTER — LAB VISIT (OUTPATIENT)
Dept: LAB | Facility: HOSPITAL | Age: 52
End: 2022-12-02
Attending: INTERNAL MEDICINE
Payer: COMMERCIAL

## 2022-12-02 DIAGNOSIS — R79.89 ELEVATED SERUM CREATININE: ICD-10-CM

## 2022-12-02 LAB
ANION GAP SERPL CALC-SCNC: 11 MMOL/L (ref 8–16)
BUN SERPL-MCNC: 19 MG/DL (ref 6–20)
CALCIUM SERPL-MCNC: 9.2 MG/DL (ref 8.7–10.5)
CHLORIDE SERPL-SCNC: 110 MMOL/L (ref 95–110)
CO2 SERPL-SCNC: 19 MMOL/L (ref 23–29)
CREAT SERPL-MCNC: 1.3 MG/DL (ref 0.5–1.4)
EST. GFR  (NO RACE VARIABLE): >60 ML/MIN/1.73 M^2
GLUCOSE SERPL-MCNC: 192 MG/DL (ref 70–110)
POTASSIUM SERPL-SCNC: 4.4 MMOL/L (ref 3.5–5.1)
SODIUM SERPL-SCNC: 140 MMOL/L (ref 136–145)

## 2022-12-02 PROCEDURE — 36415 COLL VENOUS BLD VENIPUNCTURE: CPT | Performed by: INTERNAL MEDICINE

## 2022-12-02 PROCEDURE — 80048 BASIC METABOLIC PNL TOTAL CA: CPT | Performed by: INTERNAL MEDICINE

## 2023-05-03 ENCOUNTER — TELEPHONE (OUTPATIENT)
Dept: OTOLARYNGOLOGY | Facility: CLINIC | Age: 53
End: 2023-05-03
Payer: COMMERCIAL

## 2023-05-03 NOTE — TELEPHONE ENCOUNTER
Returned call. Apt scheduled with Dr. Eng for May 4th at 3:20PM. Pt thanked me and verbalized understanding.     ----- Message from Bekah Garsia sent at 5/3/2023  3:59 PM CDT -----  .Type:  Sooner Apoointment Request    Caller is requesting a sooner appointment.  Caller declined first available appointment listed below.  Caller will not accept being placed on the waitlist and is requesting a message be sent to doctor.  Name of Caller:pt  When is the first available appointment?7/25  Symptoms:ears swelling  Would the patient rather a call back or a response via [facility]? Call back  Best Call Back Number:[phone]  Additional Information:

## 2023-05-04 ENCOUNTER — OFFICE VISIT (OUTPATIENT)
Dept: OTOLARYNGOLOGY | Facility: CLINIC | Age: 53
End: 2023-05-04
Payer: COMMERCIAL

## 2023-05-04 ENCOUNTER — LAB VISIT (OUTPATIENT)
Dept: LAB | Facility: HOSPITAL | Age: 53
End: 2023-05-04
Attending: INTERNAL MEDICINE
Payer: COMMERCIAL

## 2023-05-04 VITALS — WEIGHT: 178.13 LBS | BODY MASS INDEX: 26.31 KG/M2

## 2023-05-04 DIAGNOSIS — H60.13: Chronic | ICD-10-CM

## 2023-05-04 DIAGNOSIS — H61.23 BILATERAL IMPACTED CERUMEN: ICD-10-CM

## 2023-05-04 DIAGNOSIS — H60.313 CHRONIC DIFFUSE OTITIS EXTERNA OF BOTH EARS: Primary | Chronic | ICD-10-CM

## 2023-05-04 DIAGNOSIS — E11.42 TYPE 2 DIABETES MELLITUS WITH POLYNEUROPATHY: ICD-10-CM

## 2023-05-04 DIAGNOSIS — I10 ESSENTIAL (PRIMARY) HYPERTENSION: ICD-10-CM

## 2023-05-04 PROBLEM — F10.939 ALCOHOL WITHDRAWAL SYNDROME WITH COMPLICATION: Status: RESOLVED | Noted: 2021-01-25 | Resolved: 2023-05-04

## 2023-05-04 PROBLEM — F10.20 ALCOHOL USE DISORDER, SEVERE, DEPENDENCE: Chronic | Status: RESOLVED | Noted: 2020-09-23 | Resolved: 2023-05-04

## 2023-05-04 LAB
ALBUMIN SERPL BCP-MCNC: 3.6 G/DL (ref 3.5–5.2)
ALP SERPL-CCNC: 79 U/L (ref 55–135)
ALT SERPL W/O P-5'-P-CCNC: 14 U/L (ref 10–44)
ANION GAP SERPL CALC-SCNC: 11 MMOL/L (ref 8–16)
AST SERPL-CCNC: 13 U/L (ref 10–40)
BASOPHILS # BLD AUTO: 0.06 K/UL (ref 0–0.2)
BASOPHILS NFR BLD: 0.7 % (ref 0–1.9)
BILIRUB SERPL-MCNC: 0.5 MG/DL (ref 0.1–1)
BUN SERPL-MCNC: 25 MG/DL (ref 6–20)
CALCIUM SERPL-MCNC: 9.7 MG/DL (ref 8.7–10.5)
CHLORIDE SERPL-SCNC: 108 MMOL/L (ref 95–110)
CHOLEST SERPL-MCNC: 126 MG/DL (ref 120–199)
CHOLEST/HDLC SERPL: 3.8 {RATIO} (ref 2–5)
CO2 SERPL-SCNC: 21 MMOL/L (ref 23–29)
CREAT SERPL-MCNC: 1.4 MG/DL (ref 0.5–1.4)
DIFFERENTIAL METHOD: ABNORMAL
EOSINOPHIL # BLD AUTO: 0.2 K/UL (ref 0–0.5)
EOSINOPHIL NFR BLD: 2.1 % (ref 0–8)
ERYTHROCYTE [DISTWIDTH] IN BLOOD BY AUTOMATED COUNT: 13 % (ref 11.5–14.5)
EST. GFR  (NO RACE VARIABLE): >60 ML/MIN/1.73 M^2
ESTIMATED AVG GLUCOSE: 209 MG/DL (ref 68–131)
GLUCOSE SERPL-MCNC: 206 MG/DL (ref 70–110)
HBA1C MFR BLD: 8.9 % (ref 4–5.6)
HCT VFR BLD AUTO: 42 % (ref 40–54)
HDLC SERPL-MCNC: 33 MG/DL (ref 40–75)
HDLC SERPL: 26.2 % (ref 20–50)
HGB BLD-MCNC: 13.5 G/DL (ref 14–18)
IMM GRANULOCYTES # BLD AUTO: 0.03 K/UL (ref 0–0.04)
IMM GRANULOCYTES NFR BLD AUTO: 0.4 % (ref 0–0.5)
LDLC SERPL CALC-MCNC: 66.2 MG/DL (ref 63–159)
LYMPHOCYTES # BLD AUTO: 2.1 K/UL (ref 1–4.8)
LYMPHOCYTES NFR BLD: 25.3 % (ref 18–48)
MCH RBC QN AUTO: 28.8 PG (ref 27–31)
MCHC RBC AUTO-ENTMCNC: 32.1 G/DL (ref 32–36)
MCV RBC AUTO: 90 FL (ref 82–98)
MONOCYTES # BLD AUTO: 0.6 K/UL (ref 0.3–1)
MONOCYTES NFR BLD: 7 % (ref 4–15)
NEUTROPHILS # BLD AUTO: 5.3 K/UL (ref 1.8–7.7)
NEUTROPHILS NFR BLD: 64.5 % (ref 38–73)
NONHDLC SERPL-MCNC: 93 MG/DL
NRBC BLD-RTO: 0 /100 WBC
PLATELET # BLD AUTO: 267 K/UL (ref 150–450)
PMV BLD AUTO: 10.3 FL (ref 9.2–12.9)
POTASSIUM SERPL-SCNC: 3.9 MMOL/L (ref 3.5–5.1)
PROT SERPL-MCNC: 7.9 G/DL (ref 6–8.4)
RBC # BLD AUTO: 4.68 M/UL (ref 4.6–6.2)
SODIUM SERPL-SCNC: 140 MMOL/L (ref 136–145)
TRIGL SERPL-MCNC: 134 MG/DL (ref 30–150)
WBC # BLD AUTO: 8.15 K/UL (ref 3.9–12.7)

## 2023-05-04 PROCEDURE — 99214 PR OFFICE/OUTPT VISIT, EST, LEVL IV, 30-39 MIN: ICD-10-PCS | Mod: 25,S$GLB,, | Performed by: OTOLARYNGOLOGY

## 2023-05-04 PROCEDURE — 3052F HG A1C>EQUAL 8.0%<EQUAL 9.0%: CPT | Mod: CPTII,S$GLB,, | Performed by: OTOLARYNGOLOGY

## 2023-05-04 PROCEDURE — 69210 REMOVE IMPACTED EAR WAX UNI: CPT | Mod: S$GLB,,, | Performed by: OTOLARYNGOLOGY

## 2023-05-04 PROCEDURE — 99999 PR PBB SHADOW E&M-EST. PATIENT-LVL III: ICD-10-PCS | Mod: PBBFAC,,, | Performed by: OTOLARYNGOLOGY

## 2023-05-04 PROCEDURE — 99214 OFFICE O/P EST MOD 30 MIN: CPT | Mod: 25,S$GLB,, | Performed by: OTOLARYNGOLOGY

## 2023-05-04 PROCEDURE — 3008F BODY MASS INDEX DOCD: CPT | Mod: CPTII,S$GLB,, | Performed by: OTOLARYNGOLOGY

## 2023-05-04 PROCEDURE — 83036 HEMOGLOBIN GLYCOSYLATED A1C: CPT | Performed by: INTERNAL MEDICINE

## 2023-05-04 PROCEDURE — 69210 EAR CERUMEN REMOVAL: ICD-10-PCS | Mod: S$GLB,,, | Performed by: OTOLARYNGOLOGY

## 2023-05-04 PROCEDURE — 80053 COMPREHEN METABOLIC PANEL: CPT | Performed by: INTERNAL MEDICINE

## 2023-05-04 PROCEDURE — 3052F PR MOST RECENT HEMOGLOBIN A1C LEVEL 8.0 - < 9.0%: ICD-10-PCS | Mod: CPTII,S$GLB,, | Performed by: OTOLARYNGOLOGY

## 2023-05-04 PROCEDURE — 1159F MED LIST DOCD IN RCRD: CPT | Mod: CPTII,S$GLB,, | Performed by: OTOLARYNGOLOGY

## 2023-05-04 PROCEDURE — 85025 COMPLETE CBC W/AUTO DIFF WBC: CPT | Performed by: INTERNAL MEDICINE

## 2023-05-04 PROCEDURE — 80061 LIPID PANEL: CPT | Performed by: INTERNAL MEDICINE

## 2023-05-04 PROCEDURE — 3008F PR BODY MASS INDEX (BMI) DOCUMENTED: ICD-10-PCS | Mod: CPTII,S$GLB,, | Performed by: OTOLARYNGOLOGY

## 2023-05-04 PROCEDURE — 1159F PR MEDICATION LIST DOCUMENTED IN MEDICAL RECORD: ICD-10-PCS | Mod: CPTII,S$GLB,, | Performed by: OTOLARYNGOLOGY

## 2023-05-04 PROCEDURE — 4010F PR ACE/ARB THEARPY RXD/TAKEN: ICD-10-PCS | Mod: CPTII,S$GLB,, | Performed by: OTOLARYNGOLOGY

## 2023-05-04 PROCEDURE — 4010F ACE/ARB THERAPY RXD/TAKEN: CPT | Mod: CPTII,S$GLB,, | Performed by: OTOLARYNGOLOGY

## 2023-05-04 PROCEDURE — 99999 PR PBB SHADOW E&M-EST. PATIENT-LVL III: CPT | Mod: PBBFAC,,, | Performed by: OTOLARYNGOLOGY

## 2023-05-04 PROCEDURE — 36415 COLL VENOUS BLD VENIPUNCTURE: CPT | Performed by: INTERNAL MEDICINE

## 2023-05-04 RX ORDER — CIPROFLOXACIN 500 MG/1
500 TABLET ORAL 2 TIMES DAILY
Qty: 28 TABLET | Refills: 0 | Status: SHIPPED | OUTPATIENT
Start: 2023-05-04 | End: 2023-05-18

## 2023-05-04 RX ORDER — CETIRIZINE HYDROCHLORIDE 10 MG/1
10 TABLET ORAL DAILY
Qty: 30 TABLET | Refills: 10 | Status: SHIPPED | OUTPATIENT
Start: 2023-05-04 | End: 2024-05-03

## 2023-05-04 RX ORDER — NYSTATIN AND TRIAMCINOLONE ACETONIDE 100000; 1 [USP'U]/G; MG/G
OINTMENT TOPICAL 2 TIMES DAILY
Qty: 15 G | Refills: 0 | Status: SHIPPED | OUTPATIENT
Start: 2023-05-04

## 2023-05-04 NOTE — PATIENT INSTRUCTIONS
Antibiotics and ointments twice daily.    Zyrtec daily.      Keep ear skin clean with gentle soap and dry thoroughly.

## 2023-05-04 NOTE — PROGRESS NOTES
Chief Complaint   Patient presents with    Swelling     Both ears        HPI:   Abe is a 52 y.o. male who presents for evaluation of bilateral ear pain and swelling. The pain has been present for 12 month(s). The pain is described as moderate.  The ear is tender to touch. The post auricular area is not inflammed.  The patient has previously seen  for the same issue.  She had prescribed antibiotics and Mycolog ointment.  The issue did improve for a while, but he notes that it started up again when he discontinued the ointment that he had been using.  He notes a history of diabetes and his blood sugars have not been well controlled lately.  Recent A1c level performed was higher than a few months ago.  He notes a history of eczema and other skin conditions.  He has not seen a dermatologist in a few years but previously was prescribed facial wash for facial skin eczema/irritation    He occasionally has issues with cerumen impaction as well.              Past Medical History:   Diagnosis Date    Diabetes mellitus      Social History     Socioeconomic History    Marital status:    Tobacco Use    Smoking status: Never    Smokeless tobacco: Never    Tobacco comments:     2 cigs today    Substance and Sexual Activity    Alcohol use: Yes     Comment: daily alcohol, 1-2 pints per day,.    Drug use: No     Past Surgical History:   Procedure Laterality Date    BACK SURGERY      KNEE SURGERY       No family history on file.        Review of Systems  General: negative for chills, fever or weight loss  Psychological: negative for mood changes or depression  Ophthalmic: negative for blurry vision, photophobia or eye pain  ENT: see HPI  Respiratory: no cough, shortness of breath, or wheezing  Cardiovascular: no chest pain or dyspnea on exertion  Gastrointestinal: no abdominal pain, change in bowel habits, or black/ bloody stools  Musculoskeletal: negative for gait disturbance or muscular weakness  Neurological:  no syncope or seizures; no ataxia  Dermatological: negative for pruritis,  rash and jaundice  Hematologic/lymphatic: no easy bruising, no new adenopathy      Physical Exam:    There were no vitals filed for this visit.      Constitutional:   He is oriented to person, place, and time. Vital signs are normal. He appears well-developed and well-nourished. He appears alert. He is cooperative.  Non-toxic appearance. Normal speech.      Head:  Normocephalic and atraumatic. Salivary glands normal.  Facial strength is normal.      Ears:    Right Ear: There is swelling (diffuse swelling of the auricle/pinna, no fluctuance, no fluid collections, no drainage) and tenderness. Tympanic membrane is not perforated, not erythematous and not retracted. No middle ear effusion.   Left Ear: There is swelling (diffuse swelling of the auricle/pinna, no fluctuance, no fluid collections, no drainage) and tenderness. Tympanic membrane is not perforated, not erythematous and not retracted.  No middle ear effusion.   Bilateral partial cerumen impaction.  Dry skin and skin irritation bilateral ear canals, consistent with chronic otitis externa.      Nose:  Mucosal edema present. No rhinorrhea, septal deviation, nasal septal hematoma or polyps. No epistaxis. No turbinate masses and no turbinate hypertrophy (2+ size).  Right sinus exhibits no maxillary sinus tenderness and no frontal sinus tenderness. Left sinus exhibits no maxillary sinus tenderness and no frontal sinus tenderness.     Mouth/Throat  Oropharynx clear and moist without lesions or asymmetry, normal uvula midline, lips, teeth, and gums normal and oropharynx normal. Normal dentition. No uvula swelling or oral lesions. No oropharyngeal exudate, posterior oropharyngeal edema or posterior oropharyngeal erythema. Tonsils not present, tonsillar erythema, tonsillar hyperemia, tonsillar exudate.  Mirror exam not performed due to patient tolerance.  Mirror exam not performed due to patient  tolerance.      Neck:  Neck normal without thyromegaly masses, asymmetry, normal tracheal structure, crepitus, and tenderness, thyroid normal, trachea normal, full range of motion with neck supple and no adenopathy. No JVD present. Carotid bruit is not present. Thyroid tenderness is present. No edema and no erythema present. No thyroid mass and no thyromegaly present.     He has no cervical adenopathy.     Cardiovascular:    Normal rate, regular rhythm, normal heart sounds and rate and rhythm, heart sounds, and pulses normal.              Pulmonary/Chest:   Effort and breath sounds normal.     Psychiatric:   He has a normal mood and affect. His speech is normal and behavior is normal.     Neurological:   He is alert and oriented to person, place, and time. He has neurological normal, alert and oriented. No cranial nerve deficit.     Skin:   No abrasions, lacerations, lesions, or rashes.       Ear Cerumen Removal    Date/Time: 5/4/2023 3:20 PM  Performed by: Barb Eng MD  Authorized by: Barb Eng MD     Consent Done?:  Yes (Verbal)    Local anesthetic:  None  Location details:  Both ears  Procedure type: curette    Procedure type comment:  Suction  Cerumen  Removal Results:  Cerumen completely removed  Patient tolerance:  Patient tolerated the procedure well with no immediate complications        Assessment:    ICD-10-CM ICD-9-CM    1. Chronic diffuse otitis externa of both ears  H60.313 380.23 ciprofloxacin HCl (CIPRO) 500 MG tablet      cetirizine (ZYRTEC) 10 MG tablet      nystatin-triamcinolone (MYCOLOG) ointment      2. Pinna cellulitis, bilateral  H60.13 380.10 ciprofloxacin HCl (CIPRO) 500 MG tablet      cetirizine (ZYRTEC) 10 MG tablet      nystatin-triamcinolone (MYCOLOG) ointment      3. Bilateral impacted cerumen  H61.23 380.4         The primary encounter diagnosis was Chronic diffuse otitis externa of both ears. Diagnoses of Pinna cellulitis, bilateral and Bilateral impacted cerumen were also  pertinent to this visit.      Plan:    Ciprofloxacin p.o. times 14 days, topical Mycolog b.i.d..  Daily p.o. Zyrtec for eczema.  Will not do course of steroids at this time due to elevated blood glucose level today and ongoing elevated A1c.  Patient will follow-up in 4-6 weeks for reassessment.  If he has responded to therapy we will continue daily therapies for reduction of eczema.  If patient has not had resolution of symptoms, may consider referral to Dermatology for contact dermatitis or other skin condition workup.    Barb Eng MD

## 2023-06-15 ENCOUNTER — TELEPHONE (OUTPATIENT)
Dept: DERMATOLOGY | Facility: CLINIC | Age: 53
End: 2023-06-15
Payer: COMMERCIAL

## 2023-06-15 ENCOUNTER — OFFICE VISIT (OUTPATIENT)
Dept: OTOLARYNGOLOGY | Facility: CLINIC | Age: 53
End: 2023-06-15
Payer: COMMERCIAL

## 2023-06-15 ENCOUNTER — TELEPHONE (OUTPATIENT)
Dept: OTOLARYNGOLOGY | Facility: CLINIC | Age: 53
End: 2023-06-15
Payer: COMMERCIAL

## 2023-06-15 VITALS
WEIGHT: 167.69 LBS | BODY MASS INDEX: 24.76 KG/M2 | HEART RATE: 101 BPM | SYSTOLIC BLOOD PRESSURE: 103 MMHG | TEMPERATURE: 98 F | DIASTOLIC BLOOD PRESSURE: 70 MMHG

## 2023-06-15 DIAGNOSIS — H60.313 CHRONIC DIFFUSE OTITIS EXTERNA OF BOTH EARS: Chronic | ICD-10-CM

## 2023-06-15 DIAGNOSIS — H60.13: Primary | Chronic | ICD-10-CM

## 2023-06-15 PROBLEM — M12.9 ARTHRITIS, MULTIPLE JOINT INVOLVEMENT: Status: ACTIVE | Noted: 2023-06-15

## 2023-06-15 PROCEDURE — 3078F DIAST BP <80 MM HG: CPT | Mod: CPTII,S$GLB,, | Performed by: OTOLARYNGOLOGY

## 2023-06-15 PROCEDURE — 3052F PR MOST RECENT HEMOGLOBIN A1C LEVEL 8.0 - < 9.0%: ICD-10-PCS | Mod: CPTII,S$GLB,, | Performed by: OTOLARYNGOLOGY

## 2023-06-15 PROCEDURE — 1159F PR MEDICATION LIST DOCUMENTED IN MEDICAL RECORD: ICD-10-PCS | Mod: CPTII,S$GLB,, | Performed by: OTOLARYNGOLOGY

## 2023-06-15 PROCEDURE — 99213 OFFICE O/P EST LOW 20 MIN: CPT | Mod: S$GLB,,, | Performed by: OTOLARYNGOLOGY

## 2023-06-15 PROCEDURE — 3074F PR MOST RECENT SYSTOLIC BLOOD PRESSURE < 130 MM HG: ICD-10-PCS | Mod: CPTII,S$GLB,, | Performed by: OTOLARYNGOLOGY

## 2023-06-15 PROCEDURE — 3008F BODY MASS INDEX DOCD: CPT | Mod: CPTII,S$GLB,, | Performed by: OTOLARYNGOLOGY

## 2023-06-15 PROCEDURE — 4010F ACE/ARB THERAPY RXD/TAKEN: CPT | Mod: CPTII,S$GLB,, | Performed by: OTOLARYNGOLOGY

## 2023-06-15 PROCEDURE — 99213 PR OFFICE/OUTPT VISIT, EST, LEVL III, 20-29 MIN: ICD-10-PCS | Mod: S$GLB,,, | Performed by: OTOLARYNGOLOGY

## 2023-06-15 PROCEDURE — 3074F SYST BP LT 130 MM HG: CPT | Mod: CPTII,S$GLB,, | Performed by: OTOLARYNGOLOGY

## 2023-06-15 PROCEDURE — 4010F PR ACE/ARB THEARPY RXD/TAKEN: ICD-10-PCS | Mod: CPTII,S$GLB,, | Performed by: OTOLARYNGOLOGY

## 2023-06-15 PROCEDURE — 1159F MED LIST DOCD IN RCRD: CPT | Mod: CPTII,S$GLB,, | Performed by: OTOLARYNGOLOGY

## 2023-06-15 PROCEDURE — 99999 PR PBB SHADOW E&M-EST. PATIENT-LVL V: CPT | Mod: PBBFAC,,, | Performed by: OTOLARYNGOLOGY

## 2023-06-15 PROCEDURE — 3078F PR MOST RECENT DIASTOLIC BLOOD PRESSURE < 80 MM HG: ICD-10-PCS | Mod: CPTII,S$GLB,, | Performed by: OTOLARYNGOLOGY

## 2023-06-15 PROCEDURE — 99999 PR PBB SHADOW E&M-EST. PATIENT-LVL V: ICD-10-PCS | Mod: PBBFAC,,, | Performed by: OTOLARYNGOLOGY

## 2023-06-15 PROCEDURE — 3052F HG A1C>EQUAL 8.0%<EQUAL 9.0%: CPT | Mod: CPTII,S$GLB,, | Performed by: OTOLARYNGOLOGY

## 2023-06-15 PROCEDURE — 3008F PR BODY MASS INDEX (BMI) DOCUMENTED: ICD-10-PCS | Mod: CPTII,S$GLB,, | Performed by: OTOLARYNGOLOGY

## 2023-06-15 NOTE — PATIENT INSTRUCTIONS
Referral to dermatology for chronic skin condition of ears, not responsive to topical or oral medication.      Continue to minimize trauma and manipulation of the ears.  Do not lay on ears if possible.    Cleanse with gentle soap and water, keep dry, apply ointment if desires.

## 2023-06-15 NOTE — PROGRESS NOTES
Chief Complaint   Patient presents with    Follow-up     Ear swelling       HPI:  Patient is a 52 y.o. male who has previously seen me for DIONY and swelling of bilateral pinna.      Since the last visit, the patient reports no significant change.  He tried using medications that were given, but did not feel it made much difference with his ears.  Overall they are unchanged.   to the touch, no otorrhea.  Some tenderness with movement of the jaw.    Active Ambulatory Problems     Diagnosis Date Noted    Bowel dysfunction 10/22/2018    Essential (primary) hypertension 09/27/2017    Hyperlipidemia 09/27/2017    Insomnia 09/27/2017    Leg cramps 10/05/2017    Rotator cuff syndrome of right shoulder 06/25/2018    Syncope 09/27/2017    Transaminitis 08/03/2020    Macrocytic anemia 08/03/2020    Hypokalemia 08/03/2020    Hypomagnesemia 08/03/2020    High anion gap metabolic acidosis 08/03/2020    Unintentional weight loss 08/03/2020    Hypophosphatemia 08/07/2020    Neuropathy 09/23/2020    History of knee surgery 09/23/2020    Type 2 diabetes mellitus with polyneuropathy 09/23/2020    Gait disturbance 09/23/2020    Lumbar radicular syndrome 11/12/2020    Cervical radicular pain 11/12/2020    Tremor, unspecified 01/27/2021    Pain and swelling of left knee 01/27/2021    Ataxia 01/28/2021    Weakness 01/28/2021    Arthritis, multiple joint involvement 06/15/2023     Resolved Ambulatory Problems     Diagnosis Date Noted    CLEOPATRA (acute kidney injury) 10/07/2016    Acute kidney injury 10/07/2016    Type 2 diabetes mellitus with hyperglycemia 07/23/2018    Alcohol withdrawal delirium, acute, hyperactive 08/03/2020    Alcohol withdrawal syndrome with complication 08/03/2020    Alcohol use disorder, severe, dependence 09/23/2020    Polyneuropathy due to type 2 diabetes mellitus 09/23/2020    Alcohol withdrawal syndrome with complication 01/25/2021     Past Medical History:   Diagnosis Date    Diabetes mellitus         Review of Systems  General: negative for chills, fever or weight loss  Psychological: negative for mood changes or depression  Ophthalmic: negative for blurry vision, photophobia or eye pain  ENT: see HPI  Respiratory: no cough, shortness of breath, or wheezing  Cardiovascular: no chest pain or dyspnea on exertion  Gastrointestinal: no abdominal pain, change in bowel habits, or black/ bloody stools  Musculoskeletal: negative for gait disturbance or muscular weakness  Neurological: no syncope or seizures; no ataxia  Dermatological: negative for pruritis, rash and jaundice  Hematologic/lymphatic: no easy bruising, no new adenopathy    Physical Exam     Vitals:    06/15/23 1132   BP: 103/70   Pulse: 101   Temp: 98.2 °F (36.8 °C)         Constitutional:   He is oriented to person, place, and time. Vital signs are normal. He appears well-developed and well-nourished. He appears alert. He is cooperative.  Non-toxic appearance. Normal speech.      Head:  Normocephalic and atraumatic. Salivary glands normal.  Facial strength is normal.      Ears:  Hearing normal to normal and whispered voice; external ear normal without scars, lesions, or masses; ear canal, tympanic membrane, and middle ear normal., right ear hearing normal to normal and whispered voice; external ear normal without scars, lesions, or masses; ear canal, tympanic membrane, and middle ear normal. and left ear hearing normal to normal and whispered voice; external ear normal without scars, lesions, or masses; ear canal, tympanic membrane, and middle ear normal..   Right Ear: There is swelling ((diffuse swelling of the auricle/pinna, no fluctuance, no fluid collections, no drainage)). Tympanic membrane is not perforated, not erythematous and not retracted. No middle ear effusion.   Left Ear: No swelling ((diffuse swelling of the auricle/pinna, no fluctuance, no fluid collections, no drainage)). Tympanic membrane is not perforated, not erythematous and not  retracted.  No middle ear effusion.        Nose:  Mucosal edema present. No rhinorrhea, septal deviation, nasal septal hematoma or polyps. No epistaxis. No turbinate masses and no turbinate hypertrophy (2+ size).  Right sinus exhibits no maxillary sinus tenderness and no frontal sinus tenderness. Left sinus exhibits no maxillary sinus tenderness and no frontal sinus tenderness.     Mouth/Throat  Oropharynx clear and moist without lesions or asymmetry, normal uvula midline, lips, teeth, and gums normal and oropharynx normal. Normal dentition. No uvula swelling or oral lesions. No oropharyngeal exudate, posterior oropharyngeal edema or posterior oropharyngeal erythema. Mirror exam not performed due to patient tolerance.  Mirror exam not performed due to patient tolerance.      Neck:  Neck normal without thyromegaly masses, asymmetry, normal tracheal structure, crepitus, and tenderness, thyroid normal, trachea normal, full range of motion with neck supple and no adenopathy. No JVD present. Carotid bruit is not present. Thyroid tenderness is present. No edema and no erythema present. No thyroid mass and no thyromegaly present.     He has no cervical adenopathy.     Cardiovascular:    Normal rate, regular rhythm, normal heart sounds and rate and rhythm, heart sounds, and pulses normal.              Pulmonary/Chest:   Effort and breath sounds normal.     Psychiatric:   He has a normal mood and affect. His speech is normal and behavior is normal.     Neurological:   He is alert and oriented to person, place, and time. He has neurological normal, alert and oriented. No cranial nerve deficit.     Skin:   No abrasions, lacerations, lesions, or rashes.       Assessment/Plan:      ICD-10-CM ICD-9-CM    1. Pinna cellulitis, bilateral  H60.13 380.10 Ambulatory referral/consult to Dermatology      2. Chronic diffuse otitis externa of both ears  H60.313 380.23           Patient condition has been unresponsive to topical and  systemic treatments.  At this point I do not suspect that this is infectious but more inflammatory, possible contact dermatitis.    I have referred the patient to see dermatology for their input on further workup and other possibilities of treatments that may be helpful.    Follow-up after dermatology appointment and after any treatments or implemented by Dermatology.    Barb Eng MD  [facility]

## 2023-06-15 NOTE — TELEPHONE ENCOUNTER
Spoke with pt. Informed him we don't have a [place] location. I added him to the waitlist in hopes of something sooner. Pt okay with waiting.  [place]     ----- Message from Corrie Tom MA sent at 6/15/2023  1:59 PM CDT -----  Regarding: Sooner Visit  Hi,    This patient saw Dr. Eng today and was referred to Dermatology. I was able to schedule him for the first available within the system at [facility] and it is for October. Patient wanted to know if there was anything sooner or if it is available in [place], in which I did tell him no I did not see Derm as an option for [place]. He also needs to follow back up with Dr. Eng after he see's Derm and so we are waiting to schedule his follow up based on the Derm visit. Please let me know if October is correct.    Thanks!  -Corrie

## 2023-06-15 NOTE — TELEPHONE ENCOUNTER
Returned call to patient and advised him that I am still awaiting a response from Dermatology for a sooner visit and that once we have an answer, we can book his f/u with Dr. Eng. Patient stated he was told there was Derm in [place] and was able to schedule in which I advised him he can do so if it allows him to but I can not on my end. Pt was then told again that I am still awaiting a response from [facility] AND [facility]. Message was sent on my end. Was thanked for calling.   ----- Message from Anjali Knox sent at 6/15/2023 12:15 PM CDT -----  Type:  Call back     Who Called:pt    Does the patient know what this is regarding?:Dermatology   Would the patient rather a call back or a response via [facility]? Call   Best Call Back Number:[phone]  Additional Information:

## 2023-08-18 ENCOUNTER — LAB VISIT (OUTPATIENT)
Dept: LAB | Facility: HOSPITAL | Age: 53
End: 2023-08-18
Attending: INTERNAL MEDICINE
Payer: COMMERCIAL

## 2023-08-18 DIAGNOSIS — E11.42 TYPE 2 DIABETES MELLITUS WITH POLYNEUROPATHY: ICD-10-CM

## 2023-08-18 LAB
25(OH)D3+25(OH)D2 SERPL-MCNC: 19 NG/ML (ref 30–96)
ALBUMIN SERPL BCP-MCNC: 3.9 G/DL (ref 3.5–5.2)
ALP SERPL-CCNC: 108 U/L (ref 55–135)
ALT SERPL W/O P-5'-P-CCNC: 24 U/L (ref 10–44)
ANION GAP SERPL CALC-SCNC: 11 MMOL/L (ref 8–16)
AST SERPL-CCNC: 20 U/L (ref 10–40)
BILIRUB SERPL-MCNC: 0.4 MG/DL (ref 0.1–1)
BUN SERPL-MCNC: 20 MG/DL (ref 6–20)
CALCIUM SERPL-MCNC: 9.4 MG/DL (ref 8.7–10.5)
CHLORIDE SERPL-SCNC: 114 MMOL/L (ref 95–110)
CO2 SERPL-SCNC: 17 MMOL/L (ref 23–29)
CREAT SERPL-MCNC: 1.2 MG/DL (ref 0.5–1.4)
EST. GFR  (NO RACE VARIABLE): >60 ML/MIN/1.73 M^2
ESTIMATED AVG GLUCOSE: 103 MG/DL (ref 68–131)
GLUCOSE SERPL-MCNC: 84 MG/DL (ref 70–110)
HBA1C MFR BLD: 5.2 % (ref 4–5.6)
POTASSIUM SERPL-SCNC: 4.7 MMOL/L (ref 3.5–5.1)
PROT SERPL-MCNC: 7.7 G/DL (ref 6–8.4)
PTH-INTACT SERPL-MCNC: 45.1 PG/ML (ref 9–77)
SODIUM SERPL-SCNC: 142 MMOL/L (ref 136–145)

## 2023-08-18 PROCEDURE — 82306 VITAMIN D 25 HYDROXY: CPT | Performed by: INTERNAL MEDICINE

## 2023-08-18 PROCEDURE — 80053 COMPREHEN METABOLIC PANEL: CPT | Performed by: INTERNAL MEDICINE

## 2023-08-18 PROCEDURE — 83970 ASSAY OF PARATHORMONE: CPT | Performed by: INTERNAL MEDICINE

## 2023-08-18 PROCEDURE — 36415 COLL VENOUS BLD VENIPUNCTURE: CPT | Performed by: INTERNAL MEDICINE

## 2023-08-18 PROCEDURE — 83036 HEMOGLOBIN GLYCOSYLATED A1C: CPT | Performed by: INTERNAL MEDICINE

## 2023-12-24 NOTE — MEDICAL/APP STUDENT
[facility] Medical Resident Progress Note    Subjective:      Abe Cook is a 50 y.o.  male who is being followed by the [facility] Medicine service at [facility] for EtOH withdrawal.      This morning he is resting comfortably in bed. Less jittery when aroused this morning. Complains of trouble walking unassisted. Denies specific complaints of HA, CP, SOB. Did not require PRN Ativan last night.  Patient states he wants to go back to AA meetings, is not interested in inpatient.     Objective:   Last 24 Hour Vital Signs:  BP  Min: 121/86  Max: 143/95  Temp  Av.8 °F (37.1 °C)  Min: 98.7 °F (37.1 °C)  Max: 99.1 °F (37.3 °C)  Pulse  Av.7  Min: 81  Max: 99  Resp  Av  Min: 17  Max: 19  SpO2  Av.7 %  Min: 96 %  Max: 99 %  I/O last 3 completed shifts:  In: 500 [P.O.:500]  Out: 900 [Urine:900]    Physical Examination:  GEN: alert, answers questions appropriately, no distress  HEENT: NC, superficial abrasion to right eye brow, no conjunctival injection, no rhinorhea  RESP: CTAB, no wheezes  CV: RRR, no murmurs, cap refill 3 seconds  ABD: BS present, soft, NT, ND   MSK: no deformities, no TTP  DERM: no rashes, multiple scabs over knuckles and palms  NEURO: A&O, no facial asymmetry, motor function grossly intact    Laboratory:  Laboratory Data Reviewed: yes  Pertinent Findings:  Trended Lab Data:  Recent Labs   Lab 20  0339 20  0330 20  0322   WBC 3.94 3.65* 4.41   HGB 11.3* 10.8* 10.9*   HCT 34.5* 32.7* 32.9*   * 117* 136*   * 106* 105*   RDW 12.1 12.1 12.1    142 142   K 3.8 3.4* 3.4*    112* 111*   CO2 27 22* 23   BUN 9 11 10   CREATININE 1.4 1.2 1.1   * 130* 125*   PROT 6.9 6.9 6.7   ALBUMIN 2.8* 2.8* 2.8*   BILITOT 1.4* 1.0 0.8   * 97* 89*   ALKPHOS 148* 144* 137*   ALT 42 41 44       Trended Cardiac Data:  Recent Labs   Lab 20  1748   TROPONINI 0.012         Microbiology Data Reviewed: yes  Pertinent Findings:  none    Other  "Results:  EKG (my interpretation): none new.    Radiology Data Reviewed: yes  Pertinent Findings:  None new    Current Medications:     Infusions:       Scheduled:   diazePAM  10 mg Oral Q8H    enoxaparin  40 mg Subcutaneous Q24H    folic acid  1 mg Oral Daily    gabapentin  100 mg Oral TID    lisinopriL  10 mg Oral Daily    multivitamin  1 tablet Oral Daily    thiamine  100 mg Oral Daily        PRN:  acetaminophen, lorazepam, sodium chloride 0.9%    Antibiotics and Day Number of Therapy:  none    Lines and Day Number of Therapy:  PIV 8/4-    Assessment:     Abe Cook is a 50 y.o. male with alcohol dependence, HTN, HLD, and T2DM who presents with EtOH wiithdrawal       Plan:     #Alcohol withdrawal  #Unintentional weight loss  #Fall  #Transaminitis  -Patient drinks daily for many months, recently increased during COVID quarantine, endorses approximately 20 lbs weight loss over this time  -Last drink was morning of admission  -tripped and fell day of admission with negative head/neck imaging, superficial scrapes treated in ED  -Patient counseled on the health benefits of alcohol cessation, currently action stage of change, states "I don't want to drink when I leave the hospital"   -Current regimen: Valium 10 mg q12hr, Gabapentin 100 TID, Ativan 1 mg IV q4hr PRN. Plan to discontinue on discharge  -Plan for outpatient Valium taper: 5 mg TID for one day, 5 mg BID for two days     #Macrocytic anemia  -likely related to malnutrition and EtOH abuse  -Around historical Hgb baseline at 11, MCV higher than in the past at 105  -Iron studies consistent with chronic inflammation, folate and B12 normal  -Thiamine, folate and MVI     #Thrombocytopenia  -likely related to EtOH abuse  -, trending up  -will plan to hold VTE PPX if <50     #AGMA, resolved  #Electrolyte derrangemnt  -Likely related to acute on chronic EtOH abuse and poor nutritional status   -Trend and replete PRN     #T2DM not on long-term " insulin  -A1C 5.3 this admission  -Holding home metformin  -SSI while inpatient     #HLD  -holding home statin     #HTN  -continue home lisinoprol     Diet: Diabetic  PPX: Lovenox  FULL CODE     Dispo: weaning withdrawal regimen, PT/OT evaluation ongoing     Brenton Derek  [facility] Medical Student - MS4  [facility] Hospitalist Medicine Service Team B    Liam Gómez MD  [facility] Medicine B      [facility] Medicine Hospitalist Pager numbers:   [facility] Hospitalist Medicine Team A (Andi/Khalida): [phone]  [facility] Hospitalist Medicine Team B (Jens/Barbie):  [phone]   Male

## 2024-02-09 ENCOUNTER — LAB VISIT (OUTPATIENT)
Dept: LAB | Facility: HOSPITAL | Age: 54
End: 2024-02-09
Attending: INTERNAL MEDICINE
Payer: COMMERCIAL

## 2024-02-09 DIAGNOSIS — E11.42 TYPE 2 DIABETES MELLITUS WITH POLYNEUROPATHY: ICD-10-CM

## 2024-02-09 DIAGNOSIS — Z12.5 PROSTATE CANCER SCREENING: ICD-10-CM

## 2024-02-09 PROBLEM — F33.0 MAJOR DEPRESSIVE DISORDER, RECURRENT, MILD: Status: ACTIVE | Noted: 2024-02-09

## 2024-02-09 LAB
ALBUMIN SERPL BCP-MCNC: 3.9 G/DL (ref 3.5–5.2)
ALP SERPL-CCNC: 104 U/L (ref 55–135)
ALT SERPL W/O P-5'-P-CCNC: 16 U/L (ref 10–44)
ANION GAP SERPL CALC-SCNC: 12 MMOL/L (ref 8–16)
AST SERPL-CCNC: 14 U/L (ref 10–40)
BASOPHILS # BLD AUTO: 0.05 K/UL (ref 0–0.2)
BASOPHILS NFR BLD: 0.6 % (ref 0–1.9)
BILIRUB SERPL-MCNC: 0.8 MG/DL (ref 0.1–1)
BUN SERPL-MCNC: 26 MG/DL (ref 6–20)
CALCIUM SERPL-MCNC: 9.8 MG/DL (ref 8.7–10.5)
CHLORIDE SERPL-SCNC: 107 MMOL/L (ref 95–110)
CHOLEST SERPL-MCNC: 162 MG/DL (ref 120–199)
CHOLEST/HDLC SERPL: 2.5 {RATIO} (ref 2–5)
CO2 SERPL-SCNC: 21 MMOL/L (ref 23–29)
COMPLEXED PSA SERPL-MCNC: 0.52 NG/ML (ref 0–4)
CREAT SERPL-MCNC: 1.3 MG/DL (ref 0.5–1.4)
DIFFERENTIAL METHOD BLD: NORMAL
EOSINOPHIL # BLD AUTO: 0.3 K/UL (ref 0–0.5)
EOSINOPHIL NFR BLD: 3.4 % (ref 0–8)
ERYTHROCYTE [DISTWIDTH] IN BLOOD BY AUTOMATED COUNT: 13.3 % (ref 11.5–14.5)
EST. GFR  (NO RACE VARIABLE): >60 ML/MIN/1.73 M^2
ESTIMATED AVG GLUCOSE: 105 MG/DL (ref 68–131)
GLUCOSE SERPL-MCNC: 117 MG/DL (ref 70–110)
HBA1C MFR BLD: 5.3 % (ref 4–5.6)
HCT VFR BLD AUTO: 45.4 % (ref 40–54)
HDLC SERPL-MCNC: 64 MG/DL (ref 40–75)
HDLC SERPL: 39.5 % (ref 20–50)
HGB BLD-MCNC: 15 G/DL (ref 14–18)
IMM GRANULOCYTES # BLD AUTO: 0.04 K/UL (ref 0–0.04)
IMM GRANULOCYTES NFR BLD AUTO: 0.5 % (ref 0–0.5)
LDLC SERPL CALC-MCNC: 76 MG/DL (ref 63–159)
LYMPHOCYTES # BLD AUTO: 2.2 K/UL (ref 1–4.8)
LYMPHOCYTES NFR BLD: 26.8 % (ref 18–48)
MCH RBC QN AUTO: 30.4 PG (ref 27–31)
MCHC RBC AUTO-ENTMCNC: 33 G/DL (ref 32–36)
MCV RBC AUTO: 92 FL (ref 82–98)
MONOCYTES # BLD AUTO: 0.5 K/UL (ref 0.3–1)
MONOCYTES NFR BLD: 5.6 % (ref 4–15)
NEUTROPHILS # BLD AUTO: 5.2 K/UL (ref 1.8–7.7)
NEUTROPHILS NFR BLD: 63.1 % (ref 38–73)
NONHDLC SERPL-MCNC: 98 MG/DL
NRBC BLD-RTO: 0 /100 WBC
PLATELET # BLD AUTO: 206 K/UL (ref 150–450)
PMV BLD AUTO: 10.3 FL (ref 9.2–12.9)
POTASSIUM SERPL-SCNC: 4.8 MMOL/L (ref 3.5–5.1)
PROT SERPL-MCNC: 7.6 G/DL (ref 6–8.4)
RBC # BLD AUTO: 4.94 M/UL (ref 4.6–6.2)
SODIUM SERPL-SCNC: 140 MMOL/L (ref 136–145)
TRIGL SERPL-MCNC: 110 MG/DL (ref 30–150)
WBC # BLD AUTO: 8.18 K/UL (ref 3.9–12.7)

## 2024-02-09 PROCEDURE — 80053 COMPREHEN METABOLIC PANEL: CPT | Performed by: INTERNAL MEDICINE

## 2024-02-09 PROCEDURE — 83036 HEMOGLOBIN GLYCOSYLATED A1C: CPT | Performed by: INTERNAL MEDICINE

## 2024-02-09 PROCEDURE — 85025 COMPLETE CBC W/AUTO DIFF WBC: CPT | Performed by: INTERNAL MEDICINE

## 2024-02-09 PROCEDURE — 84153 ASSAY OF PSA TOTAL: CPT | Performed by: INTERNAL MEDICINE

## 2024-02-09 PROCEDURE — 80061 LIPID PANEL: CPT | Performed by: INTERNAL MEDICINE

## 2024-02-09 PROCEDURE — 36415 COLL VENOUS BLD VENIPUNCTURE: CPT | Performed by: INTERNAL MEDICINE

## 2024-02-09 RX ORDER — METFORMIN HYDROCHLORIDE 750 MG/1
750 TABLET, EXTENDED RELEASE ORAL
Qty: 60 TABLET | Refills: 6
Start: 2024-02-09 | End: 2024-02-28

## 2025-01-30 ENCOUNTER — LAB VISIT (OUTPATIENT)
Dept: LAB | Facility: HOSPITAL | Age: 55
End: 2025-01-30
Attending: INTERNAL MEDICINE
Payer: MEDICARE

## 2025-01-30 DIAGNOSIS — E11.42 TYPE 2 DIABETES MELLITUS WITH POLYNEUROPATHY: ICD-10-CM

## 2025-01-30 DIAGNOSIS — I10 ESSENTIAL (PRIMARY) HYPERTENSION: ICD-10-CM

## 2025-01-30 DIAGNOSIS — E55.9 VITAMIN D DEFICIENCY: ICD-10-CM

## 2025-01-30 PROBLEM — M67.431 GANGLION CYST OF DORSUM OF RIGHT WRIST: Status: ACTIVE | Noted: 2025-01-30

## 2025-01-30 LAB
ALBUMIN SERPL BCP-MCNC: 3.8 G/DL (ref 3.5–5.2)
ALP SERPL-CCNC: 82 U/L (ref 40–150)
ALT SERPL W/O P-5'-P-CCNC: 11 U/L (ref 10–44)
ANION GAP SERPL CALC-SCNC: 9 MMOL/L (ref 8–16)
AST SERPL-CCNC: 11 U/L (ref 10–40)
BASOPHILS # BLD AUTO: 0.06 K/UL (ref 0–0.2)
BASOPHILS NFR BLD: 0.7 % (ref 0–1.9)
BILIRUB SERPL-MCNC: 0.6 MG/DL (ref 0.1–1)
BUN SERPL-MCNC: 25 MG/DL (ref 6–20)
CALCIUM SERPL-MCNC: 9 MG/DL (ref 8.7–10.5)
CHLORIDE SERPL-SCNC: 112 MMOL/L (ref 95–110)
CHOLEST SERPL-MCNC: 167 MG/DL (ref 120–199)
CHOLEST/HDLC SERPL: 3 {RATIO} (ref 2–5)
CO2 SERPL-SCNC: 21 MMOL/L (ref 23–29)
CREAT SERPL-MCNC: 1.2 MG/DL (ref 0.5–1.4)
DIFFERENTIAL METHOD BLD: ABNORMAL
EOSINOPHIL # BLD AUTO: 0.1 K/UL (ref 0–0.5)
EOSINOPHIL NFR BLD: 0.8 % (ref 0–8)
ERYTHROCYTE [DISTWIDTH] IN BLOOD BY AUTOMATED COUNT: 13 % (ref 11.5–14.5)
EST. GFR  (NO RACE VARIABLE): >60 ML/MIN/1.73 M^2
ESTIMATED AVG GLUCOSE: 111 MG/DL (ref 68–131)
GLUCOSE SERPL-MCNC: 104 MG/DL (ref 70–110)
HBA1C MFR BLD: 5.5 % (ref 4–5.6)
HCT VFR BLD AUTO: 42.2 % (ref 40–54)
HDLC SERPL-MCNC: 56 MG/DL (ref 40–75)
HDLC SERPL: 33.5 % (ref 20–50)
HGB BLD-MCNC: 14.1 G/DL (ref 14–18)
IMM GRANULOCYTES # BLD AUTO: 0.05 K/UL (ref 0–0.04)
IMM GRANULOCYTES NFR BLD AUTO: 0.6 % (ref 0–0.5)
LDLC SERPL CALC-MCNC: 98.4 MG/DL (ref 63–159)
LYMPHOCYTES # BLD AUTO: 1.9 K/UL (ref 1–4.8)
LYMPHOCYTES NFR BLD: 21.5 % (ref 18–48)
MCH RBC QN AUTO: 32.3 PG (ref 27–31)
MCHC RBC AUTO-ENTMCNC: 33.4 G/DL (ref 32–36)
MCV RBC AUTO: 97 FL (ref 82–98)
MONOCYTES # BLD AUTO: 0.5 K/UL (ref 0.3–1)
MONOCYTES NFR BLD: 5.8 % (ref 4–15)
NEUTROPHILS # BLD AUTO: 6.3 K/UL (ref 1.8–7.7)
NEUTROPHILS NFR BLD: 70.6 % (ref 38–73)
NONHDLC SERPL-MCNC: 111 MG/DL
NRBC BLD-RTO: 0 /100 WBC
PLATELET # BLD AUTO: 196 K/UL (ref 150–450)
PMV BLD AUTO: 10 FL (ref 9.2–12.9)
POTASSIUM SERPL-SCNC: 4.4 MMOL/L (ref 3.5–5.1)
PROT SERPL-MCNC: 7.3 G/DL (ref 6–8.4)
RBC # BLD AUTO: 4.37 M/UL (ref 4.6–6.2)
SODIUM SERPL-SCNC: 142 MMOL/L (ref 136–145)
TRIGL SERPL-MCNC: 63 MG/DL (ref 30–150)
WBC # BLD AUTO: 8.93 K/UL (ref 3.9–12.7)

## 2025-01-30 PROCEDURE — 83036 HEMOGLOBIN GLYCOSYLATED A1C: CPT | Performed by: INTERNAL MEDICINE

## 2025-01-30 PROCEDURE — 80061 LIPID PANEL: CPT | Performed by: INTERNAL MEDICINE

## 2025-01-30 PROCEDURE — 85025 COMPLETE CBC W/AUTO DIFF WBC: CPT | Performed by: INTERNAL MEDICINE

## 2025-01-30 PROCEDURE — 82306 VITAMIN D 25 HYDROXY: CPT | Performed by: INTERNAL MEDICINE

## 2025-01-30 PROCEDURE — 80053 COMPREHEN METABOLIC PANEL: CPT | Performed by: INTERNAL MEDICINE

## 2025-01-31 DIAGNOSIS — E55.9 VITAMIN D DEFICIENCY: ICD-10-CM

## 2025-01-31 LAB — 25(OH)D3+25(OH)D2 SERPL-MCNC: 9 NG/ML (ref 30–96)

## 2025-01-31 RX ORDER — ERGOCALCIFEROL 1.25 MG/1
50000 CAPSULE ORAL
Qty: 12 CAPSULE | Refills: 3 | Status: SHIPPED | OUTPATIENT
Start: 2025-01-31